# Patient Record
Sex: MALE | Race: WHITE
[De-identification: names, ages, dates, MRNs, and addresses within clinical notes are randomized per-mention and may not be internally consistent; named-entity substitution may affect disease eponyms.]

---

## 2020-04-23 ENCOUNTER — HOSPITAL ENCOUNTER (INPATIENT)
Dept: HOSPITAL 52 - LL.ED | Age: 55
LOS: 4 days | Discharge: HOME | DRG: 418 | End: 2020-04-27
Attending: EMERGENCY MEDICINE | Admitting: EMERGENCY MEDICINE
Payer: COMMERCIAL

## 2020-04-23 DIAGNOSIS — D64.89: ICD-10-CM

## 2020-04-23 DIAGNOSIS — R50.82: ICD-10-CM

## 2020-04-23 DIAGNOSIS — E88.09: ICD-10-CM

## 2020-04-23 DIAGNOSIS — Z79.899: ICD-10-CM

## 2020-04-23 DIAGNOSIS — K56.7: ICD-10-CM

## 2020-04-23 DIAGNOSIS — Z87.891: ICD-10-CM

## 2020-04-23 DIAGNOSIS — K80.00: Primary | ICD-10-CM

## 2020-04-23 DIAGNOSIS — E83.51: ICD-10-CM

## 2020-04-23 DIAGNOSIS — K21.0: ICD-10-CM

## 2020-04-23 DIAGNOSIS — K44.9: ICD-10-CM

## 2020-04-23 PROCEDURE — C9113 INJ PANTOPRAZOLE SODIUM, VIA: HCPCS

## 2020-04-23 PROCEDURE — G0378 HOSPITAL OBSERVATION PER HR: HCPCS

## 2020-04-24 LAB
CHLORIDE SERPL-SCNC: 102 MMOL/L (ref 98–107)
CHLORIDE SERPL-SCNC: 105 MMOL/L (ref 98–107)
SODIUM SERPL-SCNC: 140 MMOL/L (ref 136–145)
SODIUM SERPL-SCNC: 140 MMOL/L (ref 136–145)

## 2020-04-24 PROCEDURE — 0FT44ZZ RESECTION OF GALLBLADDER, PERCUTANEOUS ENDOSCOPIC APPROACH: ICD-10-PCS | Performed by: SURGERY

## 2020-04-24 RX ADMIN — Medication PRN ML: at 17:41

## 2020-04-24 NOTE — PCM.PN
- General Info


Date of Service: 04/24/20


Admission Dx/Problem (Free Text): 





Epigastric/RUQ pain


Subjective Update: 





Pain improved with morphine.  Continues to persist however with no otherwise 

significant changes.  No appetite.


Functional Status: Reports: Pain Controlled (2/10 at this time with MS), 

Ambulating, Urinating.  Denies: New Symptoms





- Review of Systems


General: Reports: Appetite (low).  Denies: Fever, Weakness, Fatigue, Malaise, 

Chills, Night Sweats


HEENT: Reports: No Symptoms


Pulmonary: Reports: No Symptoms


Cardiovascular: Denies: Chest Pain, Palpitations, Dyspnea on Exertion, Orthopnea

, Edema, Lightheadedness


Gastrointestinal: Reports: Abdominal Pain, Decreased Appetite, Nausea, Other (

pain at times radiates to patient's back).  Denies: Constipation, Diarrhea, 

Difficulty Swallowing, Flatus, Melena, Vomiting


Genitourinary: Reports: No Symptoms


Musculoskeletal: Reports: No Symptoms


Skin: Reports: No Symptoms


Neurological: Reports: No Symptoms


Psychiatric: Reports: No Symptoms





- Patient Data


Vitals - Most Recent: 


 Last Vital Signs











Temp  36.6 C   04/24/20 06:19


 


Pulse  95   04/24/20 06:19


 


Resp  19   04/24/20 06:19


 


BP  128/77   04/24/20 06:19


 


Pulse Ox  99   04/24/20 06:19











Weight - Most Recent: 78.471 kg


I&O - Last 24 Hours: 


 Intake & Output











 04/23/20 04/24/20 04/24/20





 22:59 06:59 14:59


 


Output Total   500


 


Balance   -500











Lab Results Last 24 Hours: 


 Laboratory Results - last 24 hr











  04/23/20 04/23/20 04/23/20 Range/Units





  23:22 23:22 23:22 


 


WBC  9.7    (4.0-10.2)  K/uL


 


RBC  4.92    (4.33-5.41)  M/uL


 


Hgb  14.5    (13.1-16.8)  g/dL


 


Hct  43.5    (39.0-49.0)  %


 


MCV  88.4    (84.0-98.0)  fL


 


MCH  29.5    (28.2-33.3)  pg


 


MCHC  33.3    (31.7-36.0)  g/dL


 


RDW  13.2    (11.2-14.1)  %


 


Plt Count  286    (150-350)  K/uL


 


Neut % (Auto)  63.5    (45.0-80.0)  %


 


Lymph % (Auto)  25.4    (10.0-50.0)  %


 


Mono % (Auto)  9.1    (2.0-14.0)  %


 


Eos % (Auto)  1.7    (0.0-5.0)  %


 


Baso % (Auto)  0.3    (0.0-2.0)  %


 


Neut # (Auto)  6.17    (1.40-7.00)  K/uL


 


Lymph # (Auto)  2.47    (0.50-3.50)  K/uL


 


Mono # (Auto)  0.88    (0.00-1.00)  K/uL


 


Eos # (Auto)  0.17    (0.00-0.50)  K/uL


 


Baso # (Auto)  0.03    (0.00-0.20)  K/uL


 


Sodium   140   (136-145)  mmol/L


 


Potassium   3.9   (3.5-5.1)  mmol/L


 


Chloride   102   ()  mmol/L


 


Carbon Dioxide   29.7   (21.0-32.0)  mmol/L


 


BUN   11   (7-18)  mg/dL


 


Creatinine   0.92   (0.51-1.17)  mg/dL


 


Est Cr Clr Drug Dosing   88.80   mL/min


 


Estimated GFR (MDRD)   > 60   mL/min


 


Glucose   103   ()  mg/dL


 


Lactic Acid    1.1  (0.4-2.0)  mmol/L


 


Calcium   8.6   (8.5-10.1)  mg/dL


 


Total Bilirubin   0.2   (0.2-1.0)  mg/dL


 


AST   19   (15-37)  U/L


 


ALT   40   (12-78)  U/L


 


Alkaline Phosphatase   77   ()  IU/L


 


Creatine Kinase     ()  U/L


 


Creatine Kinase Index     (0.0-2.5)  %


 


CK-MB (CK-2)     (0.00-3.60)  ng/mL


 


Troponin I     (0.000-0.056)  ng/mL


 


Total Protein   7.8   (6.4-8.2)  g/dL


 


Albumin   3.9   (3.4-5.0)  g/dL


 


Amylase   51   ()  U/L


 


Lipase   150   ()  U/L


 


Specimen Type     


 


Urine Color     


 


Urine Appearance     


 


Urine pH     (5.0-9.0)  


 


Ur Specific Gravity     (1.005-1.030)  


 


Urine Protein     (NEGATIVE)  mg/dL


 


Urine Glucose (UA)     (NEGATIVE)  mg/dL


 


Urine Ketones     (NEGATIVE)  mg/dL


 


Urine Occult Blood     (NEGATIVE)  


 


Urine Nitrite     (NEGATIVE)  


 


Urine Bilirubin     (NEGATIVE)  


 


Urine Urobilinogen     (0.2-1.0)  E.U./dL


 


Ur Leukocyte Esterase     (NEGATIVE)  


 


Urine RBC     /HPF


 


Urine WBC     /HPF


 


Ur Epithelial Cells     /LPF


 


Amorphous Sediment     (0/HPF)  /HPF


 


Urine Bacteria     (NONE TO FEW)  /HPF














  04/23/20 04/23/20 04/24/20 Range/Units





  23:25 23:33 10:50 


 


WBC    9.7  (4.0-10.2)  K/uL


 


RBC    4.43  (4.33-5.41)  M/uL


 


Hgb    13.1  (13.1-16.8)  g/dL


 


Hct    39.4  (39.0-49.0)  %


 


MCV    88.9  (84.0-98.0)  fL


 


MCH    29.6  (28.2-33.3)  pg


 


MCHC    33.2  (31.7-36.0)  g/dL


 


RDW    13.2  (11.2-14.1)  %


 


Plt Count    259  (150-350)  K/uL


 


Neut % (Auto)    77.4  (45.0-80.0)  %


 


Lymph % (Auto)    14.2  (10.0-50.0)  %


 


Mono % (Auto)    8.0  (2.0-14.0)  %


 


Eos % (Auto)    0.2  (0.0-5.0)  %


 


Baso % (Auto)    0.2  (0.0-2.0)  %


 


Neut # (Auto)    7.49 H  (1.40-7.00)  K/uL


 


Lymph # (Auto)    1.38  (0.50-3.50)  K/uL


 


Mono # (Auto)    0.78  (0.00-1.00)  K/uL


 


Eos # (Auto)    0.02  (0.00-0.50)  K/uL


 


Baso # (Auto)    0.02  (0.00-0.20)  K/uL


 


Sodium     (136-145)  mmol/L


 


Potassium     (3.5-5.1)  mmol/L


 


Chloride     ()  mmol/L


 


Carbon Dioxide     (21.0-32.0)  mmol/L


 


BUN     (7-18)  mg/dL


 


Creatinine     (0.51-1.17)  mg/dL


 


Est Cr Clr Drug Dosing     mL/min


 


Estimated GFR (MDRD)     mL/min


 


Glucose     ()  mg/dL


 


Lactic Acid     (0.4-2.0)  mmol/L


 


Calcium     (8.5-10.1)  mg/dL


 


Total Bilirubin     (0.2-1.0)  mg/dL


 


AST     (15-37)  U/L


 


ALT     (12-78)  U/L


 


Alkaline Phosphatase     ()  IU/L


 


Creatine Kinase   61   ()  U/L


 


Creatine Kinase Index   0.7   (0.0-2.5)  %


 


CK-MB (CK-2)   0.40   (0.00-3.60)  ng/mL


 


Troponin I   0.000   (0.000-0.056)  ng/mL


 


Total Protein     (6.4-8.2)  g/dL


 


Albumin     (3.4-5.0)  g/dL


 


Amylase     ()  U/L


 


Lipase     ()  U/L


 


Specimen Type  Urinvoid    


 


Urine Color  Yellow    


 


Urine Appearance  Cloudy    


 


Urine pH  8.5    (5.0-9.0)  


 


Ur Specific Gravity  1.020    (1.005-1.030)  


 


Urine Protein  Negative    (NEGATIVE)  mg/dL


 


Urine Glucose (UA)  Negative    (NEGATIVE)  mg/dL


 


Urine Ketones  Negative    (NEGATIVE)  mg/dL


 


Urine Occult Blood  Negative    (NEGATIVE)  


 


Urine Nitrite  Negative    (NEGATIVE)  


 


Urine Bilirubin  Negative    (NEGATIVE)  


 


Urine Urobilinogen  0.2    (0.2-1.0)  E.U./dL


 


Ur Leukocyte Esterase  Negative    (NEGATIVE)  


 


Urine RBC  Not seen    /HPF


 


Urine WBC  Not seen    /HPF


 


Ur Epithelial Cells  Not seen    /LPF


 


Amorphous Sediment  Many H    (0/HPF)  /HPF


 


Urine Bacteria  Few    (NONE TO FEW)  /HPF














  04/24/20 04/24/20 Range/Units





  10:50 10:50 


 


WBC    (4.0-10.2)  K/uL


 


RBC    (4.33-5.41)  M/uL


 


Hgb    (13.1-16.8)  g/dL


 


Hct    (39.0-49.0)  %


 


MCV    (84.0-98.0)  fL


 


MCH    (28.2-33.3)  pg


 


MCHC    (31.7-36.0)  g/dL


 


RDW    (11.2-14.1)  %


 


Plt Count    (150-350)  K/uL


 


Neut % (Auto)    (45.0-80.0)  %


 


Lymph % (Auto)    (10.0-50.0)  %


 


Mono % (Auto)    (2.0-14.0)  %


 


Eos % (Auto)    (0.0-5.0)  %


 


Baso % (Auto)    (0.0-2.0)  %


 


Neut # (Auto)    (1.40-7.00)  K/uL


 


Lymph # (Auto)    (0.50-3.50)  K/uL


 


Mono # (Auto)    (0.00-1.00)  K/uL


 


Eos # (Auto)    (0.00-0.50)  K/uL


 


Baso # (Auto)    (0.00-0.20)  K/uL


 


Sodium  140   (136-145)  mmol/L


 


Potassium  4.1   (3.5-5.1)  mmol/L


 


Chloride  105   ()  mmol/L


 


Carbon Dioxide  24.7   (21.0-32.0)  mmol/L


 


BUN  11   (7-18)  mg/dL


 


Creatinine  0.87   (0.51-1.17)  mg/dL


 


Est Cr Clr Drug Dosing  93.91   mL/min


 


Estimated GFR (MDRD)  > 60   mL/min


 


Glucose  114 H   ()  mg/dL


 


Lactic Acid   1.5  (0.4-2.0)  mmol/L


 


Calcium  8.1 L   (8.5-10.1)  mg/dL


 


Total Bilirubin  0.5   (0.2-1.0)  mg/dL


 


AST  15   (15-37)  U/L


 


ALT  33   (12-78)  U/L


 


Alkaline Phosphatase  63   ()  IU/L


 


Creatine Kinase    ()  U/L


 


Creatine Kinase Index    (0.0-2.5)  %


 


CK-MB (CK-2)    (0.00-3.60)  ng/mL


 


Troponin I    (0.000-0.056)  ng/mL


 


Total Protein  6.8   (6.4-8.2)  g/dL


 


Albumin  3.5   (3.4-5.0)  g/dL


 


Amylase  43   ()  U/L


 


Lipase  97   ()  U/L


 


Specimen Type    


 


Urine Color    


 


Urine Appearance    


 


Urine pH    (5.0-9.0)  


 


Ur Specific Gravity    (1.005-1.030)  


 


Urine Protein    (NEGATIVE)  mg/dL


 


Urine Glucose (UA)    (NEGATIVE)  mg/dL


 


Urine Ketones    (NEGATIVE)  mg/dL


 


Urine Occult Blood    (NEGATIVE)  


 


Urine Nitrite    (NEGATIVE)  


 


Urine Bilirubin    (NEGATIVE)  


 


Urine Urobilinogen    (0.2-1.0)  E.U./dL


 


Ur Leukocyte Esterase    (NEGATIVE)  


 


Urine RBC    /HPF


 


Urine WBC    /HPF


 


Ur Epithelial Cells    /LPF


 


Amorphous Sediment    (0/HPF)  /HPF


 


Urine Bacteria    (NONE TO FEW)  /HPF











Med Orders - Current: 


 Current Medications





Calcium Carbonate/Glycine (Tums Extra Strength)  750 mg PO Q2HR PRN


   PRN Reason: Indigestion


Sodium Chloride (Normal Saline)  1,000 mls @ 125 mls/hr IV ASDIRECTED WING


   Last Admin: 04/24/20 10:00 Dose:  125 mls/hr


Morphine Sulfate (Morphine)  2 mg IVPUSH Q2H PRN


   PRN Reason: Pain (severe 7-10)


Ondansetron HCl (Zofran)  4 mg IVPUSH Q6H PRN


   PRN Reason: Nausea/Vomiting


   Last Admin: 04/24/20 06:10 Dose:  4 mg


Sodium Chloride (Saline Flush)  10 ml FLUSH ASDIRECTED PRN


   PRN Reason: Keep Vein Open





Discontinued Medications





Al Hydroxide/Mg Hydroxide (Gi Cocktail)  30 ml PO ONETIME ONE


   Stop: 04/23/20 23:22


   Last Admin: 04/23/20 23:27 Dose:  30 ml


Famotidine (Pepcid)  20 mg IVPUSH ONETIME ONE


   Stop: 04/24/20 00:37


   Last Admin: 04/24/20 01:40 Dose:  20 mg


Hydromorphone HCl (Dilaudid)  1 mg IM ONETIME ONE


   Stop: 04/24/20 00:44


   Last Admin: 04/24/20 01:04 Dose:  1 mg


Iopamidol (Isovue-300 (61%))  100 ml IVPUSH ONETIME ONE


   Stop: 04/24/20 01:21


   Last Admin: 04/24/20 02:03 Dose:  100 ml


Morphine Sulfate (Morphine)  4 mg IVPUSH ONETIME ONE


   Stop: 04/24/20 00:40


   Last Admin: 04/24/20 02:36 Dose:  Not Given


Ondansetron HCl (Zofran)  4 mg IVPUSH ONETIME ONE


   Stop: 04/24/20 00:40


   Last Admin: 04/24/20 02:37 Dose:  Not Given


Ondansetron HCl (Zofran Odt)  4 mg PO ONETIME ONE


   Stop: 04/24/20 00:45


   Last Admin: 04/24/20 01:03 Dose:  4 mg


Pantoprazole Sodium (Protonix Iv***)  80 mg IVPUSH .BOLUS ONE


   Stop: 04/24/20 00:37


   Last Admin: 04/24/20 01:40 Dose:  80 mg


Sucralfate (Carafate)  1 gm PO ONETIME ONE


   Stop: 04/24/20 00:41


   Last Admin: 04/24/20 01:03 Dose:  1 gm











- Exam


General: Alert, Oriented, Cooperative, Mild Distress


HEENT: Pupils Equal, Pupils Reactive, EOMI, Mucous Membr. Moist/Pink


Neck: Supple


Lungs: Clear to Auscultation, Normal Respiratory Effort


Cardiovascular: Regular Rate, Regular Rhythm


GI/Abdominal Exam: No Distention, Guarding, Tender (epigastric and RUQ), 

Abnormal Bowel Sounds (diminished throughout).  No: Rigid, Rebound


 (Male) Exam: Deferred


Extremities: Normal Inspection, Normal Capillary Refill


Skin: Warm, Dry


Neurological: No New Focal Deficit


Psy/Mental Status: Alert, Normal Affect, Normal Mood





Sepsis Event Note





- Evaluation


Sepsis Screening Result: No Definite Risk





- Focused Exam


Vital Signs: 


 Vital Signs











  Temp Pulse Resp BP BP Pulse Ox


 


 04/24/20 06:19  36.6 C  95  19  128/77   99


 


 04/24/20 00:34       99


 


 04/24/20 00:33  37.2 C  78  14   124/72  99











Date Exam was Performed: 04/24/20


Time Exam was Performed: 11:56





- Problem List & Annotations


(1) Epigastric pain


SNOMED Code(s): 23092210


   Code(s): R10.13 - EPIGASTRIC PAIN   Status: Acute   Priority: High   Current 

Visit: Yes   Onset Date: 04/22/20   Annotation/Comment:: CT of abdomen shows 

distended gallbladder with multiple stones.  Pain has not improved overall 

since admission, only temporary improvement with MS. Normal LFTs. Normal WBC. 

Cholecystectomy discussed with patient and  (Surgeon).  Patient 

agreeable with having cholecystectomy.     





(2) Hiatal hernia


SNOMED Code(s): 86207656


   Code(s): K44.9 - DIAPHRAGMATIC HERNIA WITHOUT OBSTRUCTION OR GANGRENE   

Status: Chronic   Priority: High   Current Visit: Yes   Annotation/Comment:: 

History of Hiatal Hernia with planned surgical procedure this summer to help 

alleviate symptoms.   





- Problem List Review


Problem List Initiated/Reviewed/Updated: Yes





- My Orders


Last 24 Hours: 


My Active Orders





04/24/20 00:33


Patient Status [ADT] Routine 


Oxygen Therapy [RC] PRN 


Up ad Nadeen [RC] 08,20 


Vital Signs [RC] Q4HR 


Morphine   2 mg IVPUSH Q2H PRN 


Ondansetron [Zofran]   4 mg IVPUSH Q6H PRN 


Sodium Chloride 0.9% [Saline Flush]   10 ml FLUSH ASDIRECTED PRN 


Saline Lock Insert [OM.PC] Routine 


Resuscitation Status Routine 





04/24/20 00:34


Pulse Oximetry [RC] PRN 





04/24/20 00:36


Abdomen Pelvis w Cont [CT] Routine 





04/24/20 00:40


OCCULT BLOOD DIAGNOSTIC [OP] Routine 


Calcium Carbonate [Tums Extra Strength]   750 mg PO Q2HR PRN 





04/24/20 00:41


H PYLORI STOOL ANTIGEN [MREF] Routine 





04/24/20 00:45


Sodium Chloride 0.9% [Normal Saline] 1,000 ml IV ASDIRECTED 





04/24/20 02:42


Abdomen Comp [US] Routine 














- Assessment


Assessment:: 





as above





- Plan


Plan:: 





Hospital working with patient's insurance to get authorization for 

cholecystectomy.  Once approved, procedure will be able to be performed at our 

facility.  Anticipate discharge of patient home either later this evening or 

tomorrow depending on clinical course/recovery from procedure.

## 2020-04-24 NOTE — EDM.PDOC
ED HPI GENERAL MEDICAL PROBLEM





- General


Chief Complaint: Abdominal Pain


Stated Complaint: Abd Pain


Time Seen by Provider: 04/23/20 23:45


Source of Information: Reports: Patient


History Limitations: Reports: No Limitations





- History of Present Illness


INITIAL COMMENTS - FREE TEXT/NARRATIVE: 





Patient comes in to ER with complaint of 7-8 hours of relatively constant 

epigastric pain.  At times feels like it radiates towards back.  Burping/

antacids briefly help with pain but do not last long.  Decreased appetite but 

did eat some food throughout day.  Last meal around 6-7pm.  Had similar pain 

two days ago that lasted around 12 hours.  History of hiatal hernia.  Supposed 

to be scheduled for surgical procedure this summer to help with the hiatal 

hernia and reflux symptoms.  On Protonix.  Previously diagnosed with Lyles's 

Esophagus but that cleared up on last upper GI scope. 


No specific trigger known for these two episodes.  


No history of GI surgeries. 


Tried to make self vomit two days ago but did not help. 


Denies vomiting/nausea/bowel changes.  No blood in stool.  Burping more 

frequently. 





No recent other changes/illnesses that he notes. 


HEENT negative for headache/URI complaints/dizzy/vision changes


Resp negative for SOB/cough/wheeze/acute changes


CV negative for palpitations/chest wall discomfort/pain in chest area other 

than epigastric area as noted above. 


 negative for burning/frequency/hematuria


MS/Neuro negative for limb pain/weakness/other acute changes. 


  ** Upper Epigastric


Pain Score (Numeric/FACES): 6





- Related Data


 Allergies











Allergy/AdvReac Type Severity Reaction Status Date / Time


 


No Known Allergies Allergy   Verified 09/06/18 02:12











Home Meds: 


 Home Meds





Pantoprazole Sodium [Protonix] 40 mg PO DAILY 09/06/18 [History]


Calcium Carb/Magnesium Hydrox [Rolaids Chewable Tablet] 4 tab PO ASDIRECTED 04/ 23/20 [History]











Past Medical History


HEENT History: Reports: Other (See Below)


Other HEENT History: snoring (working with [employer])


Cardiovascular History: Reports: None, Other (See Below) (borderline elevated 

BPs in past)


Respiratory History: Reports: None


Gastrointestinal History: Reports: GERD, Hiatal Hernia


Other Gastrointestinal History: Barrets


Genitourinary History: Reports: None


Musculoskeletal History: Reports: None


Neurological History: Reports: Other (See Below)


Other Neuro History: RLS


Psychiatric History: Reports: None


Endocrine/Metabolic History: Reports: None


Hematologic History: Reports: None


Immunologic History: Reports: None


Oncologic (Cancer) History: Reports: None


Dermatologic History: Reports: None





- Infectious Disease History


Infectious Disease History: Reports: Chicken Pox





Social & Family History





- Tobacco Use


Smoking Status *Q: Former Smoker


Years of Tobacco use: 10


Packs/Tins Daily: 0.5


Used Tobacco, but Quit: Yes


Month/Year Tobacco Last Used: 01/1990





- Caffeine Use


Caffeine Use: Reports: Coffee





- Alcohol Use


Alcohol Use History: Yes


Alcohol Use Frequency: Socially





- Recreational Drug Use


Recreational Drug Use: No





ED ROS GENERAL





- Review of Systems


Review Of Systems: Comprehensive ROS is negative, except as noted in HPI.





ED EXAM, GENERAL





- Physical Exam


Exam: See Below


Exam Limited By: No Limitations


General Appearance: Alert, WD/WN, Moderate Distress


Eye Exam: Bilateral Eye: EOMI, PERRL


Ears: Hearing Grossly Normal


Nose: No: Nasal Deformity, Nasal Swelling, Nasal Drainage


Throat/Mouth: Normal Lips, Normal Voice, No Airway Compromise


Head: Atraumatic, Normocephalic


Neck: Normal Inspection, Supple, Non-Tender, Full Range of Motion


Respiratory/Chest: No Respiratory Distress, Lungs Clear, Normal Breath Sounds, 

No Accessory Muscle Use, Chest Non-Tender


Cardiovascular: Regular Rate, Rhythm, No Murmur


GI/Abdominal: Soft, Tender (RUQ and epigastric area), Abnormal Bowel Sounds (

decreased throughout).  No: Guarding, Rigid, Rebound


 (Male) Exam: Deferred


Rectal (Males) Exam: Deferred


Back Exam: Normal Inspection.  No: CVA Tenderness (L), CVA Tenderness (R), 

Muscle Spasm, Paraspinal Tenderness, Vertebral Tenderness


Extremities: Normal Inspection, Normal Range of Motion, Non-Tender, Normal 

Capillary Refill


Neurological: Alert, Oriented, Normal Cognition, Normal Gait, Other (equal tone/

strength bilaterally)


Psychiatric: Anxious


Skin Exam: Warm, Dry, Intact, Normal Color





Course





- Vital Signs


Last Recorded V/S: 


 Last Vital Signs











Temp  36.9 C   04/23/20 23:28


 


Pulse  83   04/23/20 23:28


 


Resp  12   04/23/20 23:28


 


BP  145/78 H  04/23/20 23:28


 


Pulse Ox  100   04/23/20 23:28














- Orders/Labs/Meds


Orders: 


 Medication Orders





Calcium Carbonate/Glycine (Tums Extra Strength)  750 mg PO Q2HR PRN


   PRN Reason: Indigestion


Famotidine (Pepcid)  20 mg IVPUSH ONETIME ONE


   Stop: 04/24/20 00:37


Hydromorphone HCl (Dilaudid)  1 mg IM ONETIME ONE


   Stop: 04/24/20 00:44


Sodium Chloride (Normal Saline)  1,000 mls @ 125 mls/hr IV ASDIRECTED WING


Morphine Sulfate (Morphine)  2 mg IVPUSH Q2H PRN


   PRN Reason: Pain (severe 7-10)


Ondansetron HCl (Zofran)  4 mg IVPUSH Q6H PRN


   PRN Reason: Nausea/Vomiting


Ondansetron HCl (Zofran Odt)  4 mg PO ONETIME ONE


   Stop: 04/24/20 00:45


Pantoprazole Sodium (Protonix Iv***)  80 mg IVPUSH .BOLUS ONE


   Stop: 04/24/20 00:37


Sodium Chloride (Saline Flush)  10 ml FLUSH ASDIRECTED PRN


   PRN Reason: Keep Vein Open


Sucralfate (Carafate)  1 gm PO ONETIME ONE


   Stop: 04/24/20 00:41








Labs: 


 Laboratory Tests











  04/23/20 04/23/20 04/23/20 Range/Units





  23:22 23:22 23:22 


 


WBC  9.7    (4.0-10.2)  K/uL


 


RBC  4.92    (4.33-5.41)  M/uL


 


Hgb  14.5    (13.1-16.8)  g/dL


 


Hct  43.5    (39.0-49.0)  %


 


MCV  88.4    (84.0-98.0)  fL


 


MCH  29.5    (28.2-33.3)  pg


 


MCHC  33.3    (31.7-36.0)  g/dL


 


RDW  13.2    (11.2-14.1)  %


 


Plt Count  286    (150-350)  K/uL


 


Neut % (Auto)  63.5    (45.0-80.0)  %


 


Lymph % (Auto)  25.4    (10.0-50.0)  %


 


Mono % (Auto)  9.1    (2.0-14.0)  %


 


Eos % (Auto)  1.7    (0.0-5.0)  %


 


Baso % (Auto)  0.3    (0.0-2.0)  %


 


Neut # (Auto)  6.17    (1.40-7.00)  K/uL


 


Lymph # (Auto)  2.47    (0.50-3.50)  K/uL


 


Mono # (Auto)  0.88    (0.00-1.00)  K/uL


 


Eos # (Auto)  0.17    (0.00-0.50)  K/uL


 


Baso # (Auto)  0.03    (0.00-0.20)  K/uL


 


Sodium   140   (136-145)  mmol/L


 


Potassium   3.9   (3.5-5.1)  mmol/L


 


Chloride   102   ()  mmol/L


 


Carbon Dioxide   29.7   (21.0-32.0)  mmol/L


 


BUN   11   (7-18)  mg/dL


 


Creatinine   0.92   (0.51-1.17)  mg/dL


 


Est Cr Clr Drug Dosing   88.80   mL/min


 


Estimated GFR (MDRD)   > 60   mL/min


 


Glucose   103   ()  mg/dL


 


Lactic Acid    1.1  (0.4-2.0)  mmol/L


 


Calcium   8.6   (8.5-10.1)  mg/dL


 


Total Bilirubin   0.2   (0.2-1.0)  mg/dL


 


AST   19   (15-37)  U/L


 


ALT   40   (12-78)  U/L


 


Alkaline Phosphatase   77   ()  IU/L


 


Creatine Kinase     ()  U/L


 


Creatine Kinase Index     (0.0-2.5)  %


 


CK-MB (CK-2)     (0.00-3.60)  ng/mL


 


Troponin I     (0.000-0.056)  ng/mL


 


Total Protein   7.8   (6.4-8.2)  g/dL


 


Albumin   3.9   (3.4-5.0)  g/dL


 


Amylase   51   ()  U/L


 


Lipase   150   ()  U/L


 


Specimen Type     


 


Urine Color     


 


Urine Appearance     


 


Urine pH     (5.0-9.0)  


 


Ur Specific Gravity     (1.005-1.030)  


 


Urine Protein     (NEGATIVE)  mg/dL


 


Urine Glucose (UA)     (NEGATIVE)  mg/dL


 


Urine Ketones     (NEGATIVE)  mg/dL


 


Urine Occult Blood     (NEGATIVE)  


 


Urine Nitrite     (NEGATIVE)  


 


Urine Bilirubin     (NEGATIVE)  


 


Urine Urobilinogen     (0.2-1.0)  E.U./dL


 


Ur Leukocyte Esterase     (NEGATIVE)  


 


Urine RBC     /HPF


 


Urine WBC     /HPF


 


Ur Epithelial Cells     /LPF


 


Amorphous Sediment     (0/HPF)  /HPF


 


Urine Bacteria     (NONE TO FEW)  /HPF














  04/23/20 04/23/20 Range/Units





  23:25 23:33 


 


WBC    (4.0-10.2)  K/uL


 


RBC    (4.33-5.41)  M/uL


 


Hgb    (13.1-16.8)  g/dL


 


Hct    (39.0-49.0)  %


 


MCV    (84.0-98.0)  fL


 


MCH    (28.2-33.3)  pg


 


MCHC    (31.7-36.0)  g/dL


 


RDW    (11.2-14.1)  %


 


Plt Count    (150-350)  K/uL


 


Neut % (Auto)    (45.0-80.0)  %


 


Lymph % (Auto)    (10.0-50.0)  %


 


Mono % (Auto)    (2.0-14.0)  %


 


Eos % (Auto)    (0.0-5.0)  %


 


Baso % (Auto)    (0.0-2.0)  %


 


Neut # (Auto)    (1.40-7.00)  K/uL


 


Lymph # (Auto)    (0.50-3.50)  K/uL


 


Mono # (Auto)    (0.00-1.00)  K/uL


 


Eos # (Auto)    (0.00-0.50)  K/uL


 


Baso # (Auto)    (0.00-0.20)  K/uL


 


Sodium    (136-145)  mmol/L


 


Potassium    (3.5-5.1)  mmol/L


 


Chloride    ()  mmol/L


 


Carbon Dioxide    (21.0-32.0)  mmol/L


 


BUN    (7-18)  mg/dL


 


Creatinine    (0.51-1.17)  mg/dL


 


Est Cr Clr Drug Dosing    mL/min


 


Estimated GFR (MDRD)    mL/min


 


Glucose    ()  mg/dL


 


Lactic Acid    (0.4-2.0)  mmol/L


 


Calcium    (8.5-10.1)  mg/dL


 


Total Bilirubin    (0.2-1.0)  mg/dL


 


AST    (15-37)  U/L


 


ALT    (12-78)  U/L


 


Alkaline Phosphatase    ()  IU/L


 


Creatine Kinase   61  ()  U/L


 


Creatine Kinase Index   0.7  (0.0-2.5)  %


 


CK-MB (CK-2)   0.40  (0.00-3.60)  ng/mL


 


Troponin I   0.000  (0.000-0.056)  ng/mL


 


Total Protein    (6.4-8.2)  g/dL


 


Albumin    (3.4-5.0)  g/dL


 


Amylase    ()  U/L


 


Lipase    ()  U/L


 


Specimen Type  Urinvoid   


 


Urine Color  Yellow   


 


Urine Appearance  Cloudy   


 


Urine pH  8.5   (5.0-9.0)  


 


Ur Specific Gravity  1.020   (1.005-1.030)  


 


Urine Protein  Negative   (NEGATIVE)  mg/dL


 


Urine Glucose (UA)  Negative   (NEGATIVE)  mg/dL


 


Urine Ketones  Negative   (NEGATIVE)  mg/dL


 


Urine Occult Blood  Negative   (NEGATIVE)  


 


Urine Nitrite  Negative   (NEGATIVE)  


 


Urine Bilirubin  Negative   (NEGATIVE)  


 


Urine Urobilinogen  0.2   (0.2-1.0)  E.U./dL


 


Ur Leukocyte Esterase  Negative   (NEGATIVE)  


 


Urine RBC  Not seen   /HPF


 


Urine WBC  Not seen   /HPF


 


Ur Epithelial Cells  Not seen   /LPF


 


Amorphous Sediment  Many H   (0/HPF)  /HPF


 


Urine Bacteria  Few   (NONE TO FEW)  /HPF











Meds: 


Medications











Generic Name Dose Route Start Last Admin





  Trade Name Freq  PRN Reason Stop Dose Admin


 


Calcium Carbonate/Glycine  750 mg  04/24/20 00:40  





  Tums Extra Strength  PO   





  Q2HR PRN   





  Indigestion   





     





     





     


 


Famotidine  20 mg  04/24/20 00:36  





  Pepcid  IVPUSH  04/24/20 00:37  





  ONETIME ONE   





     





     





     





     


 


Hydromorphone HCl  1 mg  04/24/20 00:43  





  Dilaudid  IM  04/24/20 00:44  





  ONETIME ONE   





     





     





     





     


 


Sodium Chloride  1,000 mls @ 125 mls/hr  04/24/20 00:45  





  Normal Saline  IV   





  ASDIRECTED [facility]   





     





     





     





     


 


Morphine Sulfate  2 mg  04/24/20 00:33  





  Morphine  IVPUSH   





  Q2H PRN   





  Pain (severe 7-10)   





     





     





     


 


Ondansetron HCl  4 mg  04/24/20 00:33  





  Zofran  IVPUSH   





  Q6H PRN   





  Nausea/Vomiting   





     





     





     


 


Ondansetron HCl  4 mg  04/24/20 00:44  





  Zofran Odt  PO  04/24/20 00:45  





  ONETIME ONE   





     





     





     





     


 


Pantoprazole Sodium  80 mg  04/24/20 00:36  





  Protonix Iv***  IVPUSH  04/24/20 00:37  





  .BOLUS ONE   





     





     





     





     


 


Sodium Chloride  10 ml  04/24/20 00:33  





  Saline Flush  FLUSH   





  ASDIRECTED PRN   





  Keep Vein Open   





     





     





     


 


Sucralfate  1 gm  04/24/20 00:40  





  Carafate  PO  04/24/20 00:41  





  ONETIME ONE   





     





     





     





     














Discontinued Medications














Generic Name Dose Route Start Last Admin





  Trade Name Daniela  PRN Reason Stop Dose Admin


 


Al Hydroxide/Mg Hydroxide  30 ml  04/23/20 23:21  04/23/20 23:27





  Gi Cocktail  PO  04/23/20 23:22  30 ml





  ONETIME ONE   Administration





     





     





     





     


 


Morphine Sulfate  4 mg  04/24/20 00:39  





  Morphine  IVPUSH  04/24/20 00:40  





  ONETIME ONE   





     





     





     





     


 


Ondansetron HCl  4 mg  04/24/20 00:39  





  Zofran  IVPUSH  04/24/20 00:40  





  ONETIME ONE   





     





     





     





     














- Re-Assessments/Exams


Free Text/Narrative Re-Assessment/Exam: 





04/24/20 00:56


Some improvement noted with GI cocktail but pain returned. 


CBC/Chem/Troponin/Amylase/Lipase/UA/lactic acid normal. 


Given patient's GI history/presentation/exam suspect GI cause most likely for 

complaint.


Will admit observation and give IV fluids/pain medication/Protonix/Pepcid and 

get abdominal CT to try to pinpoint cause of pain. 











Departure





- Departure


Time of Disposition: 12:30


Disposition: Refer to Observation


Condition: Good


Clinical Impression: 


 Epigastric pain, Hiatal hernia








- Discharge Information


*PRESCRIPTION DRUG MONITORING PROGRAM REVIEWED*: Not Applicable


*COPY OF PRESCRIPTION DRUG MONITORING REPORT IN PATIENT ARIANE: Not Applicable





Sepsis Event Note





- Evaluation


Sepsis Screening Result: No Definite Risk





- Focused Exam


Vital Signs: 


 Vital Signs











  Temp Pulse Resp BP Pulse Ox


 


 04/23/20 23:28  36.9 C  83  12  145/78 H  100











Date Exam was Performed: 04/24/20


Time Exam was Performed: 00:49





- Problem List & Annotations


(1) Epigastric pain


SNOMED Code(s): 94162545


   Code(s): R10.13 - EPIGASTRIC PAIN   Status: Acute   Priority: High   Current 

Visit: Yes   Onset Date: 04/22/20   Annotation/Comment:: Differential includes 

hiatal hernia/ulcer/spasm.  Given that discomfort extends towards RUQ cannot 

exclude gallbladder contribution.  Will admit to observation for pain control 

and further workup/IV fluids. May need to consider referral to [place] to be seen 

by GI if no improvement is obtained or if findings on CT indicate need for 

transfer.    





(2) Hiatal hernia


SNOMED Code(s): 12696172


   Code(s): K44.9 - DIAPHRAGMATIC HERNIA WITHOUT OBSTRUCTION OR GANGRENE   

Status: Chronic   Priority: High   Current Visit: Yes   Annotation/Comment:: 

History of Hiatal Hernia with planned surgical procedure this summer to help 

alleviate symptoms.   





- Problem List Review


Problem List Initiated/Reviewed/Updated: Yes





- Assessment/Plan


Admission H&P: Please use this note as an admission H&P


Assessment:: 





Stable and suitable for general supervision


Plan: 





as above

## 2020-04-24 NOTE — PCM.OPNOTE
- General Post-Op/Procedure Note


Date of Surgery/Procedure: 04/24/20


Operative Procedure(s): Laparoscopic Cholecystectomy


Findings: 





Acutely inflamed gallbladder with multiple stones and Hydrops


Pre Op Diagnosis: Acute Cholecystitis with cholelithiasis


Post-Op Diagnosis: Same


Anesthesia Technique: General ET Tube


Primary Surgeon: Ge Cheng


Pathology: 





Gallbladder with stones


EBL in mLs: 150


Complications: None


Condition: Good


Free Text/Narrative:: 


 Intake & Output











 04/24/20 04/24/20 04/24/20





 06:59 14:59 22:59


 


Output Total  900 


 


Balance  -900

## 2020-04-24 NOTE — OR
Date of Procedure:  04/24/2020

 

PREOPERATIVE DIAGNOSIS:  Acute cholecystitis with cholelithiasis.

 

POSTOPERATIVE DIAGNOSIS:  Acute cholecystitis with cholelithiasis.

 

OPERATION PERFORMED:  Laparoscopic cholecystectomy.

 

INDICATIONS FOR SURGERY:  This 54-year-old male was hospitalized with upper

abdominal pain.  He had several episodes over the past several days and came in

with a severe unrelenting episode.  Workup has identified cholelithiasis with a

dilated gallbladder which is felt to be the source of his pain, and he comes for

cholecystectomy.

 

FINDINGS:  The gallbladder was severely acutely inflamed with dense adherence of

the gallbladder to the gallbladder bed, especially in its lower aspect.  It

contained multiple large and small stones.  The adjacent liver and other organs

as viewed laparoscopically appeared normal.

 

DESCRIPTION OF PROCEDURE:  The patient was taken to the operating room.  He was

given general endotracheal anesthesia and the abdomen was sterilely prepped and

draped.  An infraumbilical stab wound incision was made.  Through this, a Veress

needle was inserted and pneumoperitoneum via this needle to a pressure of 15

mmHg was achieved with carbon dioxide.  The Veress needle was replaced with a 12

mm trocar into which the 0-degree 10 mm laparoscopic camera was inserted.  Under

direct visualization, 5 mm trocars were placed in the subxiphoid midline in 2

areas of the right abdomen.  All trocar sites were infiltrated with Marcaine

prior to incision.  Intraabdominal inspection was carried out and attention was

turned to the gallbladder.  The gallbladder was markedly dilated and tense, so

an aspirating needle was inserted and the hydrops of bile aspirated from the

gallbladder which decompressed it and then allowed it to be manipulated.  It was

secured with grasping forceps and retracted superiorly and anteriorly as much as

possible.  This was difficult because of the intense inflammation and scarring,

especially in the lower aspect of the gallbladder.  Careful dissection was

carried out, gradually clearing the peritoneum and thick inflamed tissue, and

with persistent careful manipulation, the cystic artery was able to be

identified and isolated and it was then clipped and divided.  The additional

dissection identified what was felt to be the cystic duct.  Dissection was

somewhat difficult around the lower portion of the gallbladder, and eventually

the lower portion of the gallbladder was entered exposing the large stone.  This

did help to identify the anatomy, and with careful additional persistent

dissection, the cystic duct identity was able to be confirmed including its

junction with the gallbladder.  It was then doubly clipped and divided near the

gallbladder.  Additional dissection identified another branch of the cystic

artery which was doubly clipped and divided, and the gallbladder was then slowly

and carefully dissected away from the undersurface of the gallbladder using the

hook cautery device.  During this dissection, a large stone escaped from the

lumen of the gallbladder.  This stone was removed during the course of the

operation.  It had to be fragmented in order to accomplish this.  The formed

stone fragments were also removed leaving minimal stone fragments intra-

abdominally.  Dissection of the gallbladder was continued until it was

completely freed from the liver bed.  It was then placed into an Endo retrieval

bag and it was extracted through the umbilical trocar site.  Because of the

thick gallbladder and multiple large and small stones, the skin and fascial

opening had to be extended just slightly and the gallbladder and stones broken

up, but eventually it was able to be removed without any stone spillage.  Re-

inspection of the gallbladder bed was carried out and copious irrigation of the

operative region was performed.  Careful examination showed no sign of

complication.  A piece of Surgicel was placed in the lower aspect of the

gallbladder bed to assure good hemostasis, and the trocars were removed under

direct visualization and the pneumoperitoneum was evacuated.  The fascia of the

umbilical trocar site was closed with interrupted 0 Vicryl sutures.  The wounds

were carefully irrigated with Betadine and saline solution.  Skin incision was

approximated with interrupted 4-0 Vicryl in subcuticular stitch.  Benzoin and

Steri-Strips were applied followed by antibiotic ointment and sterile dressings.

The patient was then awakened, extubated, and taken from the operating room in

satisfactory condition.

 

ESTIMATED BLOOD LOSS:  150 mL.

 

COMPLICATIONS:  None.

 

PROGNOSIS:  Good.

 

PRISCILLA Cheng MD

DD:  04/24/2020 16:52:53

DT:  04/24/2020 22:58:31

Job #:  [id number]

## 2020-04-25 LAB
CHLORIDE SERPL-SCNC: 107 MMOL/L (ref 98–107)
SODIUM SERPL-SCNC: 140 MMOL/L (ref 136–145)

## 2020-04-25 RX ADMIN — Medication PRN ML: at 10:52

## 2020-04-25 RX ADMIN — Medication PRN ML: at 10:46

## 2020-04-25 RX ADMIN — Medication PRN ML: at 11:30

## 2020-04-25 RX ADMIN — METRONIDAZOLE SCH MLS/HR: 500 SOLUTION INTRAVENOUS at 11:30

## 2020-04-25 RX ADMIN — METRONIDAZOLE SCH MLS/HR: 500 SOLUTION INTRAVENOUS at 19:47

## 2020-04-25 NOTE — PCM.PN
- General Info


Date of Service: 04/25/20


Admission Dx/Problem (Free Text): 





Cholecystitis


Functional Status: Reports: Pain Controlled, Tolerating Diet, Ambulating, 

Urinating, Incentive Spirometry.  Denies: New Symptoms


Pain Score: 2





- Review of Systems


General: Reports: Fever.  Denies: Weakness, Fatigue, Malaise, Chills, Night 

Sweats, Appetite (Improving)


HEENT: Denies: Dysphasia, Ear Pain, Eye Pain, Headaches, Post Nasal Drip, Sinus 

Congestion, Sore Throat, Rhinitis, Visual Changes


Pulmonary: Reports: Pleuritic Chest Pain.  Denies: Shortness of Breath, Cough, 

Sputum, Hemoptysis, Wheezing


Cardiovascular: Reports: Lightheadedness.  Denies: Chest Pain, Palpitations, 

Dyspnea on Exertion, Orthopnea, Edema


Gastrointestinal: Reports: Abdominal Pain, Constipation, Other (No bowel 

movement since surgery).  Denies: Decreased Appetite, Diarrhea, Difficulty 

Swallowing, Flatus, Hematochezia, Melena, Nausea, Vomiting


Genitourinary: Reports: No Symptoms.  Denies: Dysuria, Frequency, Burning, Pain

, Urgency, Incontinence, Hematuria, Retention, Flank Pain


Musculoskeletal: Reports: No Symptoms.  Denies: Neck Pain, Shoulder Pain, Arm 

Pain, Back Pain, Leg Pain


Skin: Reports: Bruising (Normal postop).  Denies: Jaundice, Pallor, Diaphoresis

, Rash


Neurological: Reports: Dizziness.  Denies: Confusion, Headache, Numbness, 

Paresthesia, Seizure, Tingling, Weakness


Psychiatric: Reports: No Symptoms.  Denies: Confusion, Depression, Anxiety, 

Agitation, Cravings, Hallucinations





- Patient Data


Vitals - Most Recent: 


 Last Vital Signs











Temp  36.8 C   04/25/20 08:00


 


Pulse  79   04/25/20 08:00


 


Resp  20   04/25/20 08:00


 


BP  99/56 L  04/25/20 08:00


 


Pulse Ox  96   04/25/20 08:00








 Vital Signs - 24 hr











  04/24/20 04/24/20 04/24/20





  13:20 16:45 16:50


 


Temperature [   





Oral]   


 


Temperature [ 36.7 C 37.3 C 





Temporal]   


 


Pulse, 78 94 





Peripheral [   





Left Pulse   





Oximetry]   


 


Pulse,   





Peripheral [   





Right Pulse   





Oximetry]   


 


Respiratory 19 18 





Rate   


 


Blood Pressure 128/77  





[Left Upper Arm   





]   


 


Blood Pressure 124/72 86/48 L 





[Right Upper   





Arm]   


 


O2 Sat by Pulse 99 94 L 





Oximetry   


 


O2 Sat by Pulse   92 L





Oximetry [Room   





Air]   














  04/24/20 04/24/20 04/24/20





  17:04 17:06 17:07


 


Temperature [   





Oral]   


 


Temperature [ 37.4 C 37.4 C 





Temporal]   


 


Pulse, 79 75 





Peripheral [   





Left Pulse   





Oximetry]   


 


Pulse,   





Peripheral [   





Right Pulse   





Oximetry]   


 


Respiratory 18 20 





Rate   


 


Blood Pressure   





[Left Upper Arm   





]   


 


Blood Pressure 100/55 L  104/69





[Right Upper   





Arm]   


 


O2 Sat by Pulse 91 L 91 L 





Oximetry   


 


O2 Sat by Pulse   





Oximetry [Room   





Air]   














  04/24/20 04/24/20 04/24/20





  17:20 17:46 18:12


 


Temperature [   





Oral]   


 


Temperature [  37.0 C 37.4 C





Temporal]   


 


Pulse,  81 81





Peripheral [   





Left Pulse   





Oximetry]   


 


Pulse,   





Peripheral [   





Right Pulse   





Oximetry]   


 


Respiratory  20 18





Rate   


 


Blood Pressure  102/51 L 





[Left Upper Arm   





]   


 


Blood Pressure   96/54 L





[Right Upper   





Arm]   


 


O2 Sat by Pulse  100 97





Oximetry   


 


O2 Sat by Pulse 95  





Oximetry [Room   





Air]   














  04/24/20 04/24/20 04/24/20





  18:45 19:49 23:56


 


Temperature [   37.3 C





Oral]   


 


Temperature [ 37.3 C 36.8 C 





Temporal]   


 


Pulse,   84





Peripheral [   





Left Pulse   





Oximetry]   


 


Pulse, 78 74 





Peripheral [   





Right Pulse   





Oximetry]   


 


Respiratory 14 18 16





Rate   


 


Blood Pressure 101/55 L 91/55 L 





[Left Upper Arm   





]   


 


Blood Pressure   91/43 L





[Right Upper   





Arm]   


 


O2 Sat by Pulse 95 97 95





Oximetry   


 


O2 Sat by Pulse   





Oximetry [Room   





Air]   














  04/25/20 04/25/20





  03:27 08:00


 


Temperature [ 37.6 C 36.8 C





Oral]  


 


Temperature [  





Temporal]  


 


Pulse, 81 





Peripheral [  





Left Pulse  





Oximetry]  


 


Pulse,  79





Peripheral [  





Right Pulse  





Oximetry]  


 


Respiratory 16 20





Rate  


 


Blood Pressure 103/49 L 99/56 L





[Left Upper Arm  





]  


 


Blood Pressure  





[Right Upper  





Arm]  


 


O2 Sat by Pulse 95 96





Oximetry  


 


O2 Sat by Pulse  





Oximetry [Room  





Air]  











Weight - Most Recent: 78.471 kg


I&O - Last 24 Hours: 


 Intake & Output











 04/24/20 04/25/20 04/25/20





 22:59 06:59 14:59


 


Intake Total 1750 1834 360


 


Output Total 250 350 


 


Balance 1500 1484 360











Imaging Impressions - Last 24 Hours: 





Intraoperative cardiac monitor showed normal sinus rhythm in the 80s to 100s 

with no ectopy or arrhythmia.


Lab Results Last 24 Hours: 


 Laboratory Results - last 24 hr











  04/24/20 04/24/20 04/24/20 Range/Units





  10:50 10:50 10:50 


 


WBC  9.7    (4.0-10.2)  K/uL


 


RBC  4.43    (4.33-5.41)  M/uL


 


Hgb  13.1    (13.1-16.8)  g/dL


 


Hct  39.4    (39.0-49.0)  %


 


MCV  88.9    (84.0-98.0)  fL


 


MCH  29.6    (28.2-33.3)  pg


 


MCHC  33.2    (31.7-36.0)  g/dL


 


RDW  13.2    (11.2-14.1)  %


 


Plt Count  259    (150-350)  K/uL


 


Neut % (Auto)  77.4    (45.0-80.0)  %


 


Lymph % (Auto)  14.2    (10.0-50.0)  %


 


Mono % (Auto)  8.0    (2.0-14.0)  %


 


Eos % (Auto)  0.2    (0.0-5.0)  %


 


Baso % (Auto)  0.2    (0.0-2.0)  %


 


Neut # (Auto)  7.49 H    (1.40-7.00)  K/uL


 


Lymph # (Auto)  1.38    (0.50-3.50)  K/uL


 


Mono # (Auto)  0.78    (0.00-1.00)  K/uL


 


Eos # (Auto)  0.02    (0.00-0.50)  K/uL


 


Baso # (Auto)  0.02    (0.00-0.20)  K/uL


 


Sodium   140   (136-145)  mmol/L


 


Potassium   4.1   (3.5-5.1)  mmol/L


 


Chloride   105   ()  mmol/L


 


Carbon Dioxide   24.7   (21.0-32.0)  mmol/L


 


BUN   11   (7-18)  mg/dL


 


Creatinine   0.87   (0.51-1.17)  mg/dL


 


Est Cr Clr Drug Dosing   93.91   mL/min


 


Estimated GFR (MDRD)   > 60   mL/min


 


Glucose   114 H   ()  mg/dL


 


Lactic Acid    1.5  (0.4-2.0)  mmol/L


 


Calcium   8.1 L   (8.5-10.1)  mg/dL


 


Total Bilirubin   0.5   (0.2-1.0)  mg/dL


 


AST   15   (15-37)  U/L


 


ALT   33   (12-78)  U/L


 


Alkaline Phosphatase   63   ()  IU/L


 


Total Protein   6.8   (6.4-8.2)  g/dL


 


Albumin   3.5   (3.4-5.0)  g/dL


 


Amylase   43   ()  U/L


 


Lipase   97   ()  U/L














  04/25/20 04/25/20 Range/Units





  07:35 07:35 


 


WBC  10.4 H   (4.0-10.2)  K/uL


 


RBC  4.04 L   (4.33-5.41)  M/uL


 


Hgb  12.0 L   (13.1-16.8)  g/dL


 


Hct  36.6 L   (39.0-49.0)  %


 


MCV  90.6   (84.0-98.0)  fL


 


MCH  29.7   (28.2-33.3)  pg


 


MCHC  32.8   (31.7-36.0)  g/dL


 


RDW  13.6   (11.2-14.1)  %


 


Plt Count  238   (150-350)  K/uL


 


Neut % (Auto)  74.7   (45.0-80.0)  %


 


Lymph % (Auto)  15.4   (10.0-50.0)  %


 


Mono % (Auto)  9.8   (2.0-14.0)  %


 


Eos % (Auto)  0.0   (0.0-5.0)  %


 


Baso % (Auto)  0.1   (0.0-2.0)  %


 


Neut # (Auto)  7.79 H   (1.40-7.00)  K/uL


 


Lymph # (Auto)  1.60   (0.50-3.50)  K/uL


 


Mono # (Auto)  1.02 H   (0.00-1.00)  K/uL


 


Eos # (Auto)  0.00   (0.00-0.50)  K/uL


 


Baso # (Auto)  0.01   (0.00-0.20)  K/uL


 


Sodium   140  (136-145)  mmol/L


 


Potassium   3.8  (3.5-5.1)  mmol/L


 


Chloride   107  ()  mmol/L


 


Carbon Dioxide   25.7  (21.0-32.0)  mmol/L


 


BUN   12  (7-18)  mg/dL


 


Creatinine   1.07  (0.51-1.17)  mg/dL


 


Est Cr Clr Drug Dosing   76.36  mL/min


 


Estimated GFR (MDRD)   > 60  mL/min


 


Glucose   104  ()  mg/dL


 


Lactic Acid    (0.4-2.0)  mmol/L


 


Calcium   7.3 L  (8.5-10.1)  mg/dL


 


Total Bilirubin   0.8  (0.2-1.0)  mg/dL


 


AST   30  (15-37)  U/L


 


ALT   44  (12-78)  U/L


 


Alkaline Phosphatase   47  ()  IU/L


 


Total Protein   6.0 L  (6.4-8.2)  g/dL


 


Albumin   3.0 L  (3.4-5.0)  g/dL


 


Amylase    ()  U/L


 


Lipase    ()  U/L











Huy Results Last 24 Hours: 





None


Med Orders - Current: 


 Current Medications





Acetaminophen (Tylenol)  650 mg PO Q4H PRN


   PRN Reason: Pain (Mild 1-3)/fever


Calcium Carbonate/Glycine (Tums Extra Strength)  750 mg PO Q2HR PRN


   PRN Reason: Indigestion


Sodium Chloride (Normal Saline)  1,000 mls @ 125 mls/hr IV ASDIRECTED [facility]


   Last Admin: 04/24/20 23:55 Dose:  125 mls/hr


Ceftriaxone Sodium 1 gm/ (Sodium Chloride)  100 mls @ 200 mls/hr IV Q12H [facility]


   Last Admin: 04/25/20 10:46 Dose:  200 mls/hr


Metronidazole 500 mg/ Premix  100 mls @ 100 mls/hr IV Q8H [facility]


Morphine Sulfate (Morphine)  2 mg IVPUSH Q2H PRN


   PRN Reason: Pain (severe 7-10)


Ondansetron HCl (Zofran)  4 mg IVPUSH Q6H PRN


   PRN Reason: Nausea/Vomiting


   Last Admin: 04/24/20 06:10 Dose:  4 mg


Sodium Chloride (Saline Flush)  10 ml FLUSH ASDIRECTED PRN


   PRN Reason: Keep Vein Open


   Last Admin: 04/25/20 10:52 Dose:  10 ml


Tramadol HCl (Ultram)  50 mg PO Q6H PRN


   PRN Reason: Pain (moderate 4-6)





Discontinued Medications





Al Hydroxide/Mg Hydroxide (Gi Cocktail)  30 ml PO ONETIME ONE


   Stop: 04/23/20 23:22


   Last Admin: 04/23/20 23:27 Dose:  30 ml


Bupivacaine HCl/Epinephrine Bitart (Marcaine 0.25%/Epinephrine 1:200,000)  30 

ml INJECT .STK-MED ONE


   Stop: 04/24/20 13:51


   Last Admin: 04/24/20 13:50 Dose:  30 ml


Cefazolin Sodium (Ancef)  2 gm IV .STK-MED ONE


   Stop: 04/24/20 13:41


   Last Admin: 04/24/20 13:40 Dose:  2 gm


Famotidine (Pepcid)  20 mg IVPUSH ONETIME ONE


   Stop: 04/24/20 00:37


   Last Admin: 04/24/20 01:40 Dose:  20 mg


Fentanyl (Sublimaze) Confirm Administered Dose 250 mcg .ROUTE .STK-MED ONE


   Stop: 04/24/20 12:50


   Last Admin: 04/24/20 18:56 Dose:  Not Given


Fentanyl (Sublimaze) Confirm Administered Dose 100 mcg .ROUTE .STK-MED ONE


   Stop: 04/24/20 16:16


   Last Admin: 04/24/20 18:56 Dose:  Not Given


Hydromorphone HCl (Dilaudid)  1 mg IM ONETIME ONE


   Stop: 04/24/20 00:44


   Last Admin: 04/24/20 01:04 Dose:  1 mg


Lactated Ringer's (Ringers, Lactated)  1,000 mls @ 75 mls/hr IV ASDIRECTED [facility]


   Last Admin: 04/24/20 12:35 Dose:  75 mls/hr


Piperacillin Sod/Tazobactam (Sod 3.375 gm/ Sodium Chloride)  100 mls @ 200 mls/

hr IV Q6H [facility]


   Stop: 04/25/20 05:29


   Last Admin: 04/25/20 05:04 Dose:  200 mls/hr


Iopamidol (Isovue-300 (61%))  100 ml IVPUSH ONETIME ONE


   Stop: 04/24/20 01:21


   Last Admin: 04/24/20 02:03 Dose:  100 ml


Ketorolac Tromethamine (Toradol)  30 mg IVPUSH ONETIME ONE


   Stop: 04/24/20 17:28


   Last Admin: 04/24/20 17:35 Dose:  30 mg


Meperidine HCl (Demerol)  25 mg IVPUSH ONETIME ONE


   Stop: 04/24/20 12:10


   Last Admin: 04/24/20 12:35 Dose:  25 mg


Midazolam HCl (Versed 1 Mg/Ml) Confirm Administered Dose 2 mg .ROUTE .STK-MED 

ONE


   Stop: 04/24/20 12:50


   Last Admin: 04/24/20 18:56 Dose:  Not Given


Morphine Sulfate (Morphine)  4 mg IVPUSH ONETIME ONE


   Stop: 04/24/20 00:40


   Last Admin: 04/24/20 02:36 Dose:  Not Given


Ondansetron HCl (Zofran)  4 mg IVPUSH ONETIME ONE


   Stop: 04/24/20 00:40


   Last Admin: 04/24/20 02:37 Dose:  Not Given


Ondansetron HCl (Zofran Odt)  4 mg PO ONETIME ONE


   Stop: 04/24/20 00:45


   Last Admin: 04/24/20 01:03 Dose:  4 mg


Pantoprazole Sodium (Protonix Iv***)  80 mg IVPUSH .BOLUS ONE


   Stop: 04/24/20 00:37


   Last Admin: 04/24/20 01:40 Dose:  80 mg


Propofol (Diprivan  20 Ml) Confirm Administered Dose 200 mg .ROUTE .STK-MED ONE


   Stop: 04/24/20 12:50


   Last Admin: 04/24/20 18:56 Dose:  Not Given


Scopolamine (Transderm-Scop)  1.5 mg TRDERM Q72H ONE


   Stop: 04/24/20 12:44


   Last Admin: 04/24/20 12:55 Dose:  1.5 mg


Scopolamine (Transderm-Scop) Confirm Administered Dose 1.5 mg .ROUTE .STK-MED 

ONE


   Stop: 04/24/20 12:44


   Last Admin: 04/24/20 13:48 Dose:  Not Given


Sucralfate (Carafate)  1 gm PO ONETIME ONE


   Stop: 04/24/20 00:41


   Last Admin: 04/24/20 01:03 Dose:  1 gm











- Exam


Quality Assessment: DVT Prophylaxis.  No: Supplemental Oxygen, Central Line/PICC

, Urine Catheter, Skin Breakdown, Restraints


General: Alert, Oriented, Cooperative, No Acute Distress


HEENT: Pupils Equal, Pupils Reactive, EOMI, Mucous Membr. Moist/Pink.  No: 

Scleral Icterus


Neck: Supple, Trachea Midline, No JVD, No Thyromegaly, +2 Carotid Pulse wo 

Bruit.  No: Lymphadenopathy


Lungs: Normal Respiratory Effort, Rales (Mild bilateral basilar).  No: Rub


Cardiovascular: Regular Rate, Regular Rhythm, No Murmurs.  No: Gallops, Rubs


GI/Abdominal Exam: Distended (Mild to moderate), Tender (Mild diffuse palpation 

pain), Abnormal Bowel Sounds (Somewhat increased, however not high-pitched), 

Other (Abdominal incisions are intact with only mild serosanguineous drainage).

  No: Guarding, Rigid, Rebound


 (Male) Exam: Deferred


Back Exam: Normal Inspection, Full Range of Motion.  No: CVA Tenderness (L), 

CVA Tenderness (R), Muscle Spasm


Extremities: Normal Inspection, Normal Range of Motion, Non-Tender, No Pedal 

Edema, Normal Capillary Refill.  No: Juan Pablo's Sign


Peripheral Pulses: 2+: Radial (L), Radial (R), Dorsalis Pedis (L), Dorsalis 

Pedis (R)


Skin: Ecchymosis (Normal postoperative as above)


Wound/Incisions: Drainage (As above).  No: Erythema


Neurological: No New Focal Deficit


Psy/Mental Status: Alert, Normal Affect, Normal Mood.  No: Agitated, 

Hallucinations, Withdrawal Symptoms





Sepsis Event Note





- Evaluation


Sepsis Screening Result: No Definite Risk





- Focused Exam


Vital Signs: 


 Vital Signs











  Temp Pulse Pulse Resp BP BP Pulse Ox


 


 04/25/20 08:00  36.8 C   79  20  99/56 L   96


 


 04/25/20 03:27  37.6 C  81   16  103/49 L   95


 


 04/24/20 23:56  37.3 C  84   16   91/43 L  95











Date Exam was Performed: 04/25/20


Time Exam was Performed: 10:58





- Problem List & Annotations


(1) Acute calculous cholecystitis


SNOMED Code(s): 83874849975308


   Code(s): K80.00 - CALCULUS OF GALLBLADDER W ACUTE CHOLECYST W/O OBSTRUCTION 

  Status: Acute   Priority: High   Current Visit: Yes   Onset Date: 04/24/20   

Annotation/Comment:: Successful laparoscopic cholecystectomy with some 

difficulty on 4/24/20, however no complications including significant blood loss

, etc. Note, however, progressive anemia during this hospitalization. In 

addition, postoperative fever today and persistent constipation with patient 

not having a bowel movement since surgery. He has been gaining to eat slowly 

this morning and is tolerating that well. Patient did receive IV Ancef 

postoperatively, however will initiate additional IV Rocephin and IV Flagyl 

today. He was agreement with extended hospitalization. Abdominal checks with 

vitals.   





(2) Anemia


SNOMED Code(s): 073017839


   Code(s): D64.9 - ANEMIA, UNSPECIFIED   Status: Acute   Current Visit: Yes   

Onset Date: 04/25/20   


Qualifiers: 


   Anemia type: other cause   Other causes of anemia: other cause, not 

classified   Qualified Code(s): D64.89 - Other specified anemias   


Annotation/Comment:: Postoperative anemia with 2.5 g drop in his hemoglobins 

since admission. No significant blood loss during his laparoscopic 

cholecystectomy as above. Observe for now. Further workup depending on his 

clinical course.   





(3) GERD with esophagitis


SNOMED Code(s): 953062826


   Code(s): K21.0 - GASTRO-ESOPHAGEAL REFLUX DISEASE WITH ESOPHAGITIS   Status: 

Chronic   Priority: Medium   Current Visit: Yes   Annotation/Comment:: Known 

history of Lyles's esophagitis. Continue monitoring through his regular 

provider and GI   





(4) Hiatal hernia


SNOMED Code(s): 64345936


   Code(s): K44.9 - DIAPHRAGMATIC HERNIA WITHOUT OBSTRUCTION OR GANGRENE   

Status: Chronic   Priority: High   Current Visit: Yes   Annotation/Comment:: 

Nissen fundoplication could not be conducted concurrently with laparoscopic 

cholecystectomy as above. He does plan to have this procedure later this summer

, however, by his history.   





(5) Hypoalbuminemia


SNOMED Code(s): 345999943


   Code(s): E88.09 - OTH DISORDERS OF PLASMA-PROTEIN METABOLISM, NEC   Status: 

Acute   Priority: Medium   Current Visit: Yes   Onset Date: 04/25/20   

Annotation/Comment:: Observe for now.   





(6) Hypocalcemia


SNOMED Code(s): 1231902


   Code(s): E83.51 - HYPOCALCEMIA   Status: Acute   Priority: Medium   Current 

Visit: Yes   Onset Date: 04/25/20   Annotation/Comment:: Continue Tums for now.

   





- Problem List Review


Problem List Initiated/Reviewed/Updated: Yes





- My Orders


Last 24 Hours: 


My Active Orders





04/25/20 09:42


Communication Order [RC] ROUTINE 





04/25/20 09:44


RT Incentive Spirometry [RC] ASDIRECTED 





04/25/20 09:46


Acetaminophen [Tylenol]   650 mg PO Q4H PRN 


traMADol [Ultram]   50 mg PO Q6H PRN 





04/25/20 10:00


cefTRIAXone [Rocephin] 1 gm   Sodium Chloride 0.9% [Normal Saline] 100 ml IV 

Q12H 





04/25/20 11:00


metroNIDAZOLE/Normal Saline [Flagyl 500 MG in  ML] 500 mg   Premix Bag 1 

bag IV Q8H 





04/26/20 05:11


Abdomen Series w Chest 1V [CR] Routine 


AMYLASE [CHEM] Routine 


CBC WITH AUTO DIFF [HEME] Routine 


COMPREHENSIVE METABOLIC PN,CMP [CHEM] Routine 


INR,PT,PROTHROMBIN TIME [COAG] Routine 


LIPASE [CHEM] Routine 


MAGNESIUM [CHEM] Routine 


PTT,PARTIAL THROMBOPLSTIN TIME [COAG] Routine 














- Assessment


Assessment:: 





As above





- Plan


Plan:: 





As above. Extensive precautions were given to the patient, who is in agreement 

with the treatment plan. Probable patient discharge in the a.m. Continue 

observation status for now.

## 2020-04-26 LAB
APTT PPP: 29.9 SEC (ref 24.5–32.8)
CHLORIDE SERPL-SCNC: 106 MMOL/L (ref 98–107)
SODIUM SERPL-SCNC: 139 MMOL/L (ref 136–145)

## 2020-04-26 RX ADMIN — METRONIDAZOLE SCH MLS/HR: 500 SOLUTION INTRAVENOUS at 10:17

## 2020-04-26 RX ADMIN — Medication PRN ML: at 22:14

## 2020-04-26 RX ADMIN — METRONIDAZOLE SCH MLS/HR: 500 SOLUTION INTRAVENOUS at 19:07

## 2020-04-26 RX ADMIN — Medication PRN ML: at 08:04

## 2020-04-26 RX ADMIN — Medication PRN ML: at 10:17

## 2020-04-26 RX ADMIN — Medication PRN ML: at 09:45

## 2020-04-26 RX ADMIN — METRONIDAZOLE SCH MLS/HR: 500 SOLUTION INTRAVENOUS at 02:14

## 2020-04-26 RX ADMIN — Medication PRN ML: at 17:06

## 2020-04-26 RX ADMIN — Medication PRN ML: at 19:17

## 2020-04-26 NOTE — PCM.PN
- General Info


Date of Service: 04/26/20


Admission Dx/Problem (Free Text): 





Cholecystitis


Functional Status: Reports: Pain Controlled, Tolerating Diet, Ambulating, 

Urinating, Incentive Spirometry.  Denies: New Symptoms


Pain Score: 2





- Review of Systems


General: Reports: Fever.  Denies: Weakness, Fatigue, Malaise, Chills, Night 

Sweats, Appetite (Improving and tolerating diet)


HEENT: Reports: Glasses.  Denies: Dysphasia, Ear Pain, Eye Pain, Headaches, 

Sinus Congestion, Sore Throat, Rhinitis, Visual Changes, Other


Pulmonary: Reports: Shortness of Breath.  Denies: Pleuritic Chest Pain, Cough, 

Sputum, Hemoptysis, Wheezing


Cardiovascular: Reports: Dyspnea on Exertion.  Denies: Chest Pain, Palpitations

, Orthopnea, PND, Edema, Lightheadedness


Gastrointestinal: Reports: Abdominal Pain, Constipation, Flatus.  Denies: 

Decreased Appetite, Diarrhea, Difficulty Swallowing, Hematochezia, Melena, 

Nausea, Vomiting


Genitourinary: Reports: No Symptoms.  Denies: Dysuria, Frequency, Burning, Pain

, Urgency, Incontinence, Hematuria, Retention, Flank Pain


Musculoskeletal: Reports: No Symptoms.  Denies: Neck Pain, Shoulder Pain, Arm 

Pain, Back Pain, Leg Pain


Skin: Reports: Bruising (Improving post operative).  Denies: Cyanosis, Jaundice

, Diaphoresis


Neurological: Reports: No Symptoms.  Denies: Confusion, Dizziness, Headache, 

Numbness, Paresthesia, Syncope, Tingling, Difficulty Walking, Weakness


Psychiatric: Reports: No Symptoms.  Denies: Confusion, Depression, Anxiety, 

Agitation, Cravings, Hallucinations





- Patient Data


Vitals - Most Recent: 


 Last Vital Signs











Temp  37.4 C   04/26/20 04:00


 


Pulse  65   04/26/20 04:00


 


Resp  16   04/26/20 04:00


 


BP  101/54 L  04/26/20 04:00


 


Pulse Ox  94 L  04/26/20 04:00








 Vital Signs - 24 hr











  04/25/20 04/25/20 04/25/20





  12:45 16:00 19:56


 


Temperature [ 37.4 C 37.2 C 36.9 C





Oral]   


 


Pulse,  99 70





Peripheral [   





Left Pulse   





Oximetry]   


 


Pulse, 78  





Peripheral [   





Right Pulse   





Oximetry]   


 


Respiratory 16 16 16





Rate   


 


Blood Pressure 98/58 L  95/61





[Left Upper Arm   





]   


 


Blood Pressure  97/54 L 





[Right Upper   





Arm]   


 


O2 Sat by Pulse 95 98 94 L





Oximetry   














  04/26/20 04/26/20





  02:08 04:00


 


Temperature [ 37.5 C 37.4 C





Oral]  


 


Pulse, 75 





Peripheral [  





Left Pulse  





Oximetry]  


 


Pulse,  65





Peripheral [  





Right Pulse  





Oximetry]  


 


Respiratory 16 16





Rate  


 


Blood Pressure 99/52 L 101/54 L





[Left Upper Arm  





]  


 


Blood Pressure  





[Right Upper  





Arm]  


 


O2 Sat by Pulse 93 L 94 L





Oximetry  











Weight - Most Recent: 78.471 kg


I&O - Last 24 Hours: 


 Intake & Output











 04/25/20 04/26/20 04/26/20





 22:59 06:59 14:59


 


Intake Total 640 1414 


 


Output Total 850 600 


 


Balance -210 814 











Imaging Impressions - Last 24 Hours: 





Acute abdominal x-ray shows mild to moderate cardiomegaly with evidence of some 

fluid overload, including some Kerley B lines. Mild aortic valve calcification 

with no pulmonary infiltrates, pneumothorax, etc. Moderately elevated right 

hemidiaphragm. Occasional diffuse fluid levels consistent with possible 

beginning ileus with additional mild free air secondary to recent laparoscopic 

cholecystectomy.


Lab Results Last 24 Hours: 


 Laboratory Results - last 24 hr











  04/26/20 04/26/20 04/26/20 Range/Units





  07:30 07:30 07:30 


 


WBC  9.9    (4.0-10.2)  K/uL


 


RBC  3.66 L    (4.33-5.41)  M/uL


 


Hgb  10.9 L    (13.1-16.8)  g/dL


 


Hct  33.5 L    (39.0-49.0)  %


 


MCV  91.5    (84.0-98.0)  fL


 


MCH  29.8    (28.2-33.3)  pg


 


MCHC  32.5    (31.7-36.0)  g/dL


 


RDW  13.6    (11.2-14.1)  %


 


Plt Count  215    (150-350)  K/uL


 


Neut % (Auto)  72.9    (45.0-80.0)  %


 


Lymph % (Auto)  14.3    (10.0-50.0)  %


 


Mono % (Auto)  12.0    (2.0-14.0)  %


 


Eos % (Auto)  0.6    (0.0-5.0)  %


 


Baso % (Auto)  0.2    (0.0-2.0)  %


 


Neut # (Auto)  7.19 H    (1.40-7.00)  K/uL


 


Lymph # (Auto)  1.41    (0.50-3.50)  K/uL


 


Mono # (Auto)  1.18 H    (0.00-1.00)  K/uL


 


Eos # (Auto)  0.06    (0.00-0.50)  K/uL


 


Baso # (Auto)  0.02    (0.00-0.20)  K/uL


 


PT   10.3   (9.5-12.0)  SEC


 


INR   1.0   


 


APTT   29.9   (24.5-32.8)  SEC


 


Sodium    139  (136-145)  mmol/L


 


Potassium    3.9  (3.5-5.1)  mmol/L


 


Chloride    106  ()  mmol/L


 


Carbon Dioxide    26.2  (21.0-32.0)  mmol/L


 


BUN    13  (7-18)  mg/dL


 


Creatinine    0.90  (0.51-1.17)  mg/dL


 


Est Cr Clr Drug Dosing    90.78  mL/min


 


Estimated GFR (MDRD)    > 60  mL/min


 


Glucose    94  ()  mg/dL


 


Calcium    7.7 L  (8.5-10.1)  mg/dL


 


Magnesium    1.9  (1.8-2.4)  mg/dL


 


Total Bilirubin    0.4  (0.2-1.0)  mg/dL


 


AST    27  (15-37)  U/L


 


ALT    32  (12-78)  U/L


 


Alkaline Phosphatase    49  ()  IU/L


 


NT-Pro-B Natriuret Pep     (0-125)  pg/mL


 


Total Protein    5.8 L  (6.4-8.2)  g/dL


 


Albumin    2.7 L  (3.4-5.0)  g/dL


 


Amylase    25  ()  U/L


 


Lipase    54 L  ()  U/L














  04/26/20 Range/Units





  07:30 


 


WBC   (4.0-10.2)  K/uL


 


RBC   (4.33-5.41)  M/uL


 


Hgb   (13.1-16.8)  g/dL


 


Hct   (39.0-49.0)  %


 


MCV   (84.0-98.0)  fL


 


MCH   (28.2-33.3)  pg


 


MCHC   (31.7-36.0)  g/dL


 


RDW   (11.2-14.1)  %


 


Plt Count   (150-350)  K/uL


 


Neut % (Auto)   (45.0-80.0)  %


 


Lymph % (Auto)   (10.0-50.0)  %


 


Mono % (Auto)   (2.0-14.0)  %


 


Eos % (Auto)   (0.0-5.0)  %


 


Baso % (Auto)   (0.0-2.0)  %


 


Neut # (Auto)   (1.40-7.00)  K/uL


 


Lymph # (Auto)   (0.50-3.50)  K/uL


 


Mono # (Auto)   (0.00-1.00)  K/uL


 


Eos # (Auto)   (0.00-0.50)  K/uL


 


Baso # (Auto)   (0.00-0.20)  K/uL


 


PT   (9.5-12.0)  SEC


 


INR   


 


APTT   (24.5-32.8)  SEC


 


Sodium   (136-145)  mmol/L


 


Potassium   (3.5-5.1)  mmol/L


 


Chloride   ()  mmol/L


 


Carbon Dioxide   (21.0-32.0)  mmol/L


 


BUN   (7-18)  mg/dL


 


Creatinine   (0.51-1.17)  mg/dL


 


Est Cr Clr Drug Dosing   mL/min


 


Estimated GFR (MDRD)   mL/min


 


Glucose   ()  mg/dL


 


Calcium   (8.5-10.1)  mg/dL


 


Magnesium   (1.8-2.4)  mg/dL


 


Total Bilirubin   (0.2-1.0)  mg/dL


 


AST   (15-37)  U/L


 


ALT   (12-78)  U/L


 


Alkaline Phosphatase   ()  IU/L


 


NT-Pro-B Natriuret Pep  754 H  (0-125)  pg/mL


 


Total Protein   (6.4-8.2)  g/dL


 


Albumin   (3.4-5.0)  g/dL


 


Amylase   ()  U/L


 


Lipase   ()  U/L











Med Orders - Current: 


 Current Medications





Acetaminophen (Tylenol)  650 mg PO Q4H PRN


   PRN Reason: Pain (Mild 1-3)/fever


   Last Admin: 04/25/20 16:20 Dose:  650 mg


Calcium Carbonate/Glycine (Tums Extra Strength)  1,500 mg PO BID WING


   Last Admin: 04/26/20 08:04 Dose:  1,500 mg


Famotidine (Pepcid)  20 mg IVPUSH 0800,2000 [facility]


   Last Admin: 04/26/20 08:04 Dose:  20 mg


Furosemide (Lasix)  40 mg IVPUSH Q8H [facility]


Ceftriaxone Sodium 1 gm/ (Sodium Chloride)  100 mls @ 200 mls/hr IV Q12H [facility]


   Last Admin: 04/26/20 09:44 Dose:  200 mls/hr


Metronidazole 500 mg/ Premix  100 mls @ 100 mls/hr IV Q8H [facility]


   Last Admin: 04/26/20 02:14 Dose:  100 mls/hr


Magnesium Hydroxide (Milk Of Magnesia)  30 ml PO DAILY PRN


   PRN Reason: Constipation


   Last Admin: 04/26/20 04:12 Dose:  30 ml


Morphine Sulfate (Morphine)  2 mg IVPUSH Q2H PRN


   PRN Reason: Pain (severe 7-10)


Ondansetron HCl (Zofran)  4 mg IVPUSH Q6H PRN


   PRN Reason: Nausea/Vomiting


   Last Admin: 04/24/20 06:10 Dose:  4 mg


Pantoprazole Sodium (Protonix Iv***)  40 mg IVPUSH 0800,2000 [facility]


   Last Admin: 04/26/20 08:04 Dose:  40 mg


Sodium Chloride (Saline Flush)  10 ml FLUSH ASDIRECTED PRN


   PRN Reason: Keep Vein Open


   Last Admin: 04/26/20 09:45 Dose:  10 ml


Tramadol HCl (Ultram)  50 mg PO Q6H PRN


   PRN Reason: Pain (moderate 4-6)


   Last Admin: 04/26/20 02:18 Dose:  50 mg





Discontinued Medications





Al Hydroxide/Mg Hydroxide (Gi Cocktail)  30 ml PO ONETIME ONE


   Stop: 04/23/20 23:22


   Last Admin: 04/23/20 23:27 Dose:  30 ml


Bisacodyl (Dulcolax)  10 mg PO ONETIME ONE


   Stop: 04/26/20 03:33


   Last Admin: 04/26/20 04:12 Dose:  10 mg


Bupivacaine HCl/Epinephrine Bitart (Marcaine 0.25%/Epinephrine 1:200,000)  30 

ml INJECT .STK-MED ONE


   Stop: 04/24/20 13:51


   Last Admin: 04/24/20 13:50 Dose:  30 ml


Calcium Carbonate/Glycine (Tums Extra Strength)  750 mg PO Q2HR PRN


   PRN Reason: Indigestion


Cefazolin Sodium (Ancef)  2 gm IV .STK-MED ONE


   Stop: 04/24/20 13:41


   Last Admin: 04/24/20 13:40 Dose:  2 gm


Famotidine (Pepcid)  20 mg IVPUSH ONETIME ONE


   Stop: 04/24/20 00:37


   Last Admin: 04/24/20 01:40 Dose:  20 mg


Famotidine (Pepcid)  20 mg IVPUSH Q12H [facility]


   Last Admin: 04/25/20 11:21 Dose:  Not Given


Famotidine (Pepcid)  20 mg IVPUSH Q12H [facility]


   Last Admin: 04/25/20 11:29 Dose:  20 mg


Fentanyl (Sublimaze) Confirm Administered Dose 250 mcg .ROUTE .STK-MED ONE


   Stop: 04/24/20 12:50


   Last Admin: 04/24/20 18:56 Dose:  Not Given


Fentanyl (Sublimaze) Confirm Administered Dose 100 mcg .ROUTE .STK-MED ONE


   Stop: 04/24/20 16:16


   Last Admin: 04/24/20 18:56 Dose:  Not Given


Furosemide (Lasix)  60 mg IVPUSH NOW ONE


   Stop: 04/26/20 08:51


   Last Admin: 04/26/20 09:43 Dose:  60 mg


Hydromorphone HCl (Dilaudid)  1 mg IM ONETIME ONE


   Stop: 04/24/20 00:44


   Last Admin: 04/24/20 01:04 Dose:  1 mg


Sodium Chloride (Normal Saline)  1,000 mls @ 125 mls/hr IV ASDIRECTED [facility]


   Last Admin: 04/24/20 23:55 Dose:  125 mls/hr


Lactated Ringer's (Ringers, Lactated)  1,000 mls @ 75 mls/hr IV ASDIRECTED [facility]


   Last Admin: 04/24/20 12:35 Dose:  75 mls/hr


Piperacillin Sod/Tazobactam (Sod 3.375 gm/ Sodium Chloride)  100 mls @ 200 mls/

hr IV Q6H [facility]


   Stop: 04/25/20 05:29


   Last Admin: 04/25/20 05:04 Dose:  200 mls/hr


Sodium Chloride (Normal Saline)  1,000 mls @ 80 mls/hr IV ASDIRECTED [facility]


   Last Admin: 04/26/20 02:13 Dose:  80 mls/hr


Iopamidol (Isovue-300 (61%))  100 ml IVPUSH ONETIME ONE


   Stop: 04/24/20 01:21


   Last Admin: 04/24/20 02:03 Dose:  100 ml


Ketorolac Tromethamine (Toradol)  30 mg IVPUSH ONETIME ONE


   Stop: 04/24/20 17:28


   Last Admin: 04/24/20 17:35 Dose:  30 mg


Magnesium Citrate (Citrate Of Magnesia)  0 ml PO ONETIME ONE


   Stop: 04/26/20 09:16


   Last Admin: 04/26/20 09:44 Dose:  296 ml


Meperidine HCl (Demerol)  25 mg IVPUSH ONETIME ONE


   Stop: 04/24/20 12:10


   Last Admin: 04/24/20 12:35 Dose:  25 mg


Midazolam HCl (Versed 1 Mg/Ml) Confirm Administered Dose 2 mg .ROUTE .STK-MED 

ONE


   Stop: 04/24/20 12:50


   Last Admin: 04/24/20 18:56 Dose:  Not Given


Morphine Sulfate (Morphine)  4 mg IVPUSH ONETIME ONE


   Stop: 04/24/20 00:40


   Last Admin: 04/24/20 02:36 Dose:  Not Given


Ondansetron HCl (Zofran)  4 mg IVPUSH ONETIME ONE


   Stop: 04/24/20 00:40


   Last Admin: 04/24/20 02:37 Dose:  Not Given


Ondansetron HCl (Zofran Odt)  4 mg PO ONETIME ONE


   Stop: 04/24/20 00:45


   Last Admin: 04/24/20 01:03 Dose:  4 mg


Pantoprazole Sodium (Protonix Iv***)  80 mg IVPUSH .BOLUS ONE


   Stop: 04/24/20 00:37


   Last Admin: 04/24/20 01:40 Dose:  80 mg


Pantoprazole Sodium (Protonix Iv***)  40 mg IVPUSH Q12H [facility]


   Last Admin: 04/25/20 11:21 Dose:  Not Given


Pantoprazole Sodium (Protonix Iv***)  40 mg IVPUSH Q12H [facility]


   Last Admin: 04/25/20 11:29 Dose:  40 mg


Polyethylene Glycol (Miralax)  17 gm PO ONETIME ONE


   Stop: 04/26/20 09:16


   Last Admin: 04/26/20 09:44 Dose:  17 gm


Potassium Chloride (Klor-Con M20)  40 meq PO ONETIME ONE


   Stop: 04/26/20 08:52


   Last Admin: 04/26/20 09:39 Dose:  40 meq


Propofol (Diprivan  20 Ml) Confirm Administered Dose 200 mg .ROUTE .STK-MED ONE


   Stop: 04/24/20 12:50


   Last Admin: 04/24/20 18:56 Dose:  Not Given


Scopolamine (Transderm-Scop)  1.5 mg TRDERM Q72H ONE


   Stop: 04/24/20 12:44


   Last Admin: 04/24/20 12:55 Dose:  1.5 mg


Scopolamine (Transderm-Scop) Confirm Administered Dose 1.5 mg .ROUTE .STK-MED 

ONE


   Stop: 04/24/20 12:44


   Last Admin: 04/24/20 13:48 Dose:  Not Given


Sucralfate (Carafate)  1 gm PO ONETIME ONE


   Stop: 04/24/20 00:41


   Last Admin: 04/24/20 01:03 Dose:  1 gm











- Exam


Quality Assessment: Supplemental Oxygen, DVT Prophylaxis.  No: Central Line/PICC

, Urine Catheter, Skin Breakdown, Restraints


General: Alert, Oriented, No Acute Distress


HEENT: Pupils Equal, Pupils Reactive, EOMI, Mucous Membr. Moist/Pink, Other (He 

was wearing glasses).  No: Scleral Icterus


Neck: Supple, Trachea Midline, No JVD, No Thyromegaly.  No: Lymphadenopathy


Lungs: Rales (Mild bilateral basilar).  No: Decreased Breath Sounds, Rhonchi, 

Rub, Wheezing


Cardiovascular: Regular Rate, Regular Rhythm, No Murmurs.  No: Gallops, Rubs


GI/Abdominal Exam: No Organomegaly, No Abnormal Bruit, No Mass, Distended (

Persistent moderate), Tender (Mild diffuse palpation pain ), Abnormal Bowel 

Sounds (Diffuse decreased bowel sounds high-pitched in nature), Other (

Laparoscopic operative sites are intact clean and dry with mild ecchymosis).  No

: Guarding, Rigid, Rebound


 (Male) Exam: Deferred


Back Exam: Normal Inspection, Full Range of Motion.  No: CVA Tenderness (L), 

CVA Tenderness (R), Muscle Spasm


Extremities: Normal Inspection, Normal Range of Motion, Non-Tender, No Pedal 

Edema, Normal Capillary Refill.  No: Juan Pablo's Sign


Peripheral Pulses: 2+: Radial (L), Radial (R), Dorsalis Pedis (L), Dorsalis 

Pedis (R)


Skin: Ecchymosis (As above)


Wound/Incisions: Healing Well, Dressing Dry and Intact


Neurological: No New Focal Deficit, Other (No Clinical orthostasis)


Psy/Mental Status: Alert, Normal Affect, Normal Mood.  No: Agitated, 

Hallucinations, Withdrawal Symptoms





Sepsis Event Note





- Evaluation


Sepsis Screening Result: No Definite Risk





- Focused Exam


Vital Signs: 


 Vital Signs











  Temp Pulse Pulse Resp BP Pulse Ox


 


 04/26/20 04:00  37.4 C   65  16  101/54 L  94 L


 


 04/26/20 02:08  37.5 C  75   16  99/52 L  93 L











Date Exam was Performed: 04/26/20


Time Exam was Performed: 10:12





- Problem List & Annotations


(1) Acute calculous cholecystitis


SNOMED Code(s): 94786905739831


   Code(s): K80.00 - CALCULUS OF GALLBLADDER W ACUTE CHOLECYST W/O OBSTRUCTION 

  Status: Acute   Priority: High   Current Visit: Yes   Onset Date: 04/24/20   

Annotation/Comment:: Persistent abdominal distention with borderline beginning 

postoperative ileus by x-rays as above. Patient has not had a bowel movement to 

this point despite receiving Milk of Magnesia and Dulcolax in the early morning 

of 4/26. Magnesium citrate and MiraLAX mixture to be given in the a.m. of 4/26. 

Successful laparoscopic cholecystectomy with some difficulty on 4/24/20, 

however no complications, including significant blood loss, etc. during 

surgery. Note, however, progressive anemia during this hospitalization with 

hemoglobin of 14.5 on admission and 10.9 on 4/26, however overall stable 

hemoglobin since 4/25 with only a 1.1 g drop possibly secondary to rehydration 

effect. In addition, postoperative fever started on 4/25 and does persist 

today. He has been tolerating his diet well. Patient did receive IV Ancef 

postoperatively, however I did initiate additional IV Rocephin and IV Flagyl 

and 4/25 today. He is agreement with extended hospitalization with patient 

changed to inpatient status on 4/26 secondary to refractory symptoms as above. 

Continue Abdominal checks with vitals.   





(2) Anemia


SNOMED Code(s): 992189142


   Code(s): D64.9 - ANEMIA, UNSPECIFIED   Status: Acute   Current Visit: Yes   

Onset Date: 04/25/20   


Qualifiers: 


   Anemia type: other cause   Other causes of anemia: other cause, not 

classified   Qualified Code(s): D64.89 - Other specified anemias   


Annotation/Comment:: Postoperative anemia with 3.6 g drop in his hemoglobins 

since admission. No significant blood loss during his laparoscopic 

cholecystectomy as above. Observe for now. Further workup depending on his 

clinical course.   





(3) GERD with esophagitis


SNOMED Code(s): 622101996


   Code(s): K21.0 - GASTRO-ESOPHAGEAL REFLUX DISEASE WITH ESOPHAGITIS   Status: 

Chronic   Priority: Medium   Current Visit: Yes   Annotation/Comment:: Known 

history of Lyles's esophagitis. IV Pepcid and IV Protonix initiated on 4/25. 

Continue monitoring through his regular provider and GI with consideration of 

EGD depending on his clinical course.   





(4) Hiatal hernia


SNOMED Code(s): 26891859


   Code(s): K44.9 - DIAPHRAGMATIC HERNIA WITHOUT OBSTRUCTION OR GANGRENE   

Status: Chronic   Priority: Medium   Current Visit: Yes   Annotation/Comment:: 

Nissen fundoplication could not be conducted concurrently with laparoscopic 

cholecystectomy as above. He does plan to have this procedure later this summer

, however, by his history.   





(5) Hypoalbuminemia


SNOMED Code(s): 124755376


   Code(s): E88.09 - OTH DISORDERS OF PLASMA-PROTEIN METABOLISM, NEC   Status: 

Acute   Priority: Medium   Current Visit: Yes   Onset Date: 04/25/20   

Annotation/Comment:: Observe for now.   





(6) Hypocalcemia


SNOMED Code(s): 3219817


   Code(s): E83.51 - HYPOCALCEMIA   Status: Acute   Priority: Medium   Current 

Visit: Yes   Onset Date: 04/25/20   Annotation/Comment:: Continue Tums for now.

   





- Problem List Review


Problem List Initiated/Reviewed/Updated: Yes





- My Orders


Last 24 Hours: 


My Active Orders





04/25/20 09:42


Communication Order [RC] ROUTINE 





04/25/20 09:44


RT Incentive Spirometry [RC] ASDIRECTED 





04/25/20 09:46


Acetaminophen [Tylenol]   650 mg PO Q4H PRN 


traMADol [Ultram]   50 mg PO Q6H PRN 





04/25/20 10:00


cefTRIAXone [Rocephin] 1 gm   Sodium Chloride 0.9% [Normal Saline] 100 ml IV 

Q12H 





04/25/20 11:00


metroNIDAZOLE/Normal Saline [Flagyl 500 MG in  ML] 500 mg   Premix Bag 1 

bag IV Q8H 





04/25/20 11:13


Calcium Carbonate [Tums Extra Strength]   1,500 mg PO BID 





04/25/20 20:00


Famotidine [Pepcid]   20 mg IVPUSH 0800,2000 


Pantoprazole [ProTONIX IV***]   40 mg IVPUSH 0800,2000 04/26/20 03:32


Magnesium Hydroxide [Milk of Magnesia]   30 ml PO DAILY PRN 





04/26/20 05:11


Abdomen Series w Chest 1V [CR] Routine 





04/26/20 09:00


Admission Status [Patient Status] [ADT] Routine 





04/26/20 17:00


Furosemide [Lasix]   40 mg IVPUSH Q8H 





04/27/20 05:11


Abdomen Series w Chest 1V [CR] Routine 


BASIC METABOLIC PANEL,BMP [CHEM] Routine 


CBC WITH AUTO DIFF [HEME] Routine 


PRO B-TYPE NATRIUR PEPT,BNPPRO [CHEM] Routine 














- Assessment


Assessment:: 





As above





- Plan


Plan:: 





As above. Extensive precautions were given to the patient, who is in agreement 

with the treatment plan. Change the patient to inpatient status as above with 

probable patient discharge in the a.m. Note extended hospitalization secondary 

to multiple complications as above.

## 2020-04-27 VITALS — SYSTOLIC BLOOD PRESSURE: 113 MMHG | HEART RATE: 74 BPM | DIASTOLIC BLOOD PRESSURE: 64 MMHG

## 2020-04-27 LAB
CHLORIDE SERPL-SCNC: 103 MMOL/L (ref 98–107)
SODIUM SERPL-SCNC: 140 MMOL/L (ref 136–145)

## 2020-04-27 RX ADMIN — Medication PRN ML: at 03:44

## 2020-04-27 RX ADMIN — Medication PRN ML: at 00:17

## 2020-04-27 RX ADMIN — METRONIDAZOLE SCH MLS/HR: 500 SOLUTION INTRAVENOUS at 03:45

## 2020-04-27 RX ADMIN — Medication PRN ML: at 07:49

## 2020-04-27 NOTE — [id number].[id number]
**Discharge Summary





- Hospital Course


HPI Initial Comments: 





See emergency room note/admission H&P


Brief History: See emergency room note/admission H&P


Diagnosis: Stroke: No


Modified Kelsi Scale: Slight Disable;Unable to Carry Out Prev Act.Able to Look 

After Affairs


Modified Kelsi Scale Score: 2





- Discharge Data


Discharge Date: 04/27/20


Discharge Disposition: Home, Self-Care 01


Condition: Good





- Referral to Home Health


Primary Care Physician: 


Sheryl Martinez NP








- Discharge Diagnosis/Problem(s)


(1) Acute calculous cholecystitis


SNOMED Code(s): 06947071079053


   ICD Code: K80.00 - CALCULUS OF GALLBLADDER W ACUTE CHOLECYST W/O OBSTRUCTION

   Status: Acute   Priority: High   Current Visit: Yes   Onset Date: 04/24/20   

Problem Details: Excellent results with Kayexalate and magnesium citrate 

preparation with normal bowel movements, including this morning. Continually 

improved minimal postoperative pain with no nausea, melena, hematochezia, nausea

, emesis, etc. Note persistent abdominal distention with borderline beginning 

postoperative ileus by x-rays on 4/26 with resolution of abdominal distention 

and improved abdominal x-rays today. Patient did not have a bowel movement 

after his MiraLAX and magnesium citrate were given as above. Note previous Milk 

of Magnesia and Dulcolax in the early morning of 4/26. Successful laparoscopic 

cholecystectomy with some difficulty on 4/24/20, however no complications, 

including significant blood loss, etc. during surgery. Note, however, 

progressive anemia during this hospitalization with hemoglobin of 14.5 on 

admission and 10.9 on 4/26, however overall stable hemoglobin since 4/25 with 

only a 1.1 g drop possibly secondary to rehydration effect. Note improved 

hemoglobin of 11.9 today after diuresis with IV Lasix with no evidence of 

significant postoperative bleed, etc. In addition, postoperative fever started 

on 4/25 and does still persist today. He will be discharged with Augmentin and 

Flagyl with follow-up with Dr. Cheng on 5/1 per discharge instructions. 

The patient continues to tolerate his diet well. Patient did receive IV Ancef 

postoperatively, however I did initiate additional IV Rocephin and IV Flagyl 

and 4/25 today. He is agreement with extended hospitalization with patient 

changed to inpatient status on 4/26 secondary to refractory symptoms as above. 

Abdominal checks with vitals have been stable.   





(2) Anemia


SNOMED Code(s): 792688528


   ICD Code: D64.9 - ANEMIA, UNSPECIFIED   Status: Acute   Current Visit: Yes   

Onset Date: 04/25/20   Problem Details: Postoperative anemia with 3.6 g drop in 

his hemoglobin during this hospitalization, however improved as above. No 

significant blood loss during his laparoscopic cholecystectomy as above. 

Observe for now. Further workup depending on his clinical course.   


Qualifiers: 


   Anemia type: other cause   Other causes of anemia: other cause, not 

classified   Qualified Code(s): D64.89 - Other specified anemias   





(3) GERD with esophagitis


SNOMED Code(s): 698011523


   ICD Code: K21.0 - GASTRO-ESOPHAGEAL REFLUX DISEASE WITH ESOPHAGITIS   Status

: Chronic   Priority: Medium   Current Visit: Yes   Problem Details: Known 

history of Lyles's esophagitis. IV Pepcid and IV Protonix initiated on 4/25. 

Continue monitoring through his regular provider and GI with consideration of 

EGD depending on his clinical course.   





(4) Hiatal hernia


SNOMED Code(s): 90518164


   ICD Code: K44.9 - DIAPHRAGMATIC HERNIA WITHOUT OBSTRUCTION OR GANGRENE   

Status: Chronic   Priority: Medium   Current Visit: Yes   Problem Details: 

Nissen fundoplication could not be conducted concurrently with laparoscopic 

cholecystectomy as above. He does plan to have this procedure later this summer

, however, by his history.   





(5) Hypoalbuminemia


SNOMED Code(s): 913810596


   ICD Code: E88.09 - H DISORDERS OF PLASMA-PROTEIN METABOLISM, NEC   Status: 

Acute   Priority: Medium   Current Visit: Yes   Onset Date: 04/25/20   Problem 

Details: Observe for now.   





(6) Hypocalcemia


SNOMED Code(s): 0184109


   ICD Code: E83.51 - HYPOCALCEMIA   Status: Acute   Priority: Medium   Current 

Visit: Yes   Onset Date: 04/25/20   Problem Details: Continue Tums for now with 

improved calcium prior to discharge.   





(7) Fluid overload


SNOMED Code(s): 40871362


   ICD Code: E87.70 - FLUID OVERLOAD, UNSPECIFIED   Status: Acute   Priority: 

High   Current Visit: Yes   Onset Date: ~04/25/20   Problem Details: Mild fluid 

overload secondary to IV fluids with initiation of IV Lasix and oral potassium 

supplementation on 4/27 with excellent results. No chest pain or anginal 

complaints or evidence of significant CHF   


Qualifiers: 


   Hypervolemia type: other   Qualified Code(s): E87.79 - Other fluid overload 

  





- Patient Summary/Data


Operative Procedure(s) Performed: Laparoscopic Cholecystectomy


Complications: 1. Post operative anemia as above.  2. Post operative abdominal 

infection with secondary ileus.  3. Fluid overload with no anginal complaints 

or significant clinical CHF


Consults: 


Surgical consultation with Ge Cheng MD





Labs Pending at D/C: 





Final x-ray reports of acute abdominal x-rays on 4/26/20 and 4/27/20


Recommended Follow-up Testing/Procedures: 





As per discharge orders


Planned Operative Procedure(s) after DC: As per discharge orders


Hospital Course: 





The patient was initially placed in observation status for laparoscopic 

cholecystectomy with subsequent minor postoperative complications as above, 

which this did result in an extended hospital stay with the patient to be 

transferred from observation status to acute/inpatient care on 4/26. Otherwise 

the treatment as above. Patient's clinical exam and vital signs were much 

improved at time of discharge.





- Patient Instructions


Diet: Heart Healthy Diet


Activity: No Lifting Over 10 Pounds, No Strenuous Activities


Driving: May Drive Today


Showering/Bathing: May Shower


Wound/Incision Care: Keep Operative Site/Wound Site Clean and Dry, Change 

Dressing Daily


Notify Provider of: Fever, Increased Pain, Swelling and Redness, Drainage, 

Nausea and/or Vomiting


Other/Special Instructions: 1.  Follow-up with Ge Cheng M.D. on 5/1 at 

10:00 a.m. as already scheduled with recommended CBC, comprehensive metabolic 

panel, BNP, and acute abdominal x-rays at that time.  2.  Tylenol 650 mg by 

mouth every 4 hours and/or OTC ibuprofen 2-3 tabs by mouth every 6 hours with 

food as directed./needed. You may stagger these medications for 48-72 hours only

, which essentially means that you are receiving a pain medication about every 

2 hours.  3.  Immediately after this visit verify that your cellular telephone'

s voicemail has been activated and is empty. Also verify that your  home 

telephone's answering machine is operating properly and has space to receive 

messages. Note that it is sometimes necessary for us to be able to contact you 

at a later date to discuss your medical care.  4.   Please remember that we are 

ALWAYS here for you and want to answer any questions you may have. Feel free to 

call the hospital any time and we call you back ASAP.  SYMPTOMS TO LOOK OUT FOR

:  You have been hospitalized for your gallbladder disease and should look out 

for the following symptoms after discharge:  1.  Make absolutely certain that 

you completely  understand the reasons you are taking any of your new and/or 

old medications and/or supplements as discussed with you by the nurse at time 

of discharge. This includes possible side effects versus interactions between 

your medications and/or supplements. Don't be afraid to take extra time to ask 

any questions or express any concerns, because that is what we are here for. It 

is very important to us that  you understand your care.  2. Notify this facility

, telephone number [phone], and/or your regular provider ASAP if you 

experience any of the following symptoms:  a.  Any increased abdominal pain, 

heartburn, nausea, vomiting, etc., which has changed since sure hospital 

discharge and is not responding to medications and/or supplements you have been 

prescribed.  b.  Any change in bowel habits, including more than 5 watery 

stools per day, severe constipation with no excellent bowel movements every 2 

days after discharge, and/or any change in your normal bowel habits.  c.  Any 

sudden onset of change of your stool appearance, including blood, black tarry 

stools, etc. as discussed at discharge.  d.  Any persistent fever equal to or 

greater than 100.5, which does not respond to recommended doses of Tylenol, 

ibuprofen, Aleve, or other previously prescribed  fever medications.  SYMPTOMS 

TO LOOK OUT FOR:  You have had a mild fluid overload during this 

hospitalization and should look out for the following symptoms after discharge:

  1.  Make absolutely certain that you completely  understand the reasons you 

are taking any of your new and/or old medications and/or supplements as 

discussed with you by the nurse at time of discharge. This includes possible 

side effects versus interactions between your medications and/or supplements. 

Don't be afraid to take extra time to ask any questions or express any concerns

, because that is what we are here for. It is very important to us that  you 

understand your care.  2. Notify this facility, telephone number [phone], 

and/or your regular provider ASAP if you experience any of the following 

symptoms:  a.  Sudden chest pressure or pain especially with radiation to the 

neck, jaws, arms, mid back, etc. especially if these are associated with nausea

, cold sweats, dizziness, racing heart, weakness, near fainting, etc.  b.  Any 

shortness of breath or decreased exercise tolerance that has changed since your

  hospital discharge and is not normal for you.  c.  Any persistent heartburn 

type symptoms that is not normal for you and is not relieved by any of your 

discharge medications or supplements.  d.  Any progressive weight gain 

especially more than 5 pounds of weight gain prior to your scheduled 

appointment with your regular provider.  e.  Any racing heart, dizziness, etc. 

not associated with the other symptoms as above.  





- Discharge Plan


*PRESCRIPTION DRUG MONITORING PROGRAM REVIEWED*: Not Applicable


*COPY OF PRESCRIPTION DRUG MONITORING REPORT IN PATIENT ARIANE: Not Applicable


Prescriptions/Med Rec: 


Amoxicillin/Potassium Clav [Augmentin 875-125 Tablet] 1 each PO BIDMEALS #14 

tablet


Furosemide [Lasix] 20 mg PO DAILY #7 tab


metroNIDAZOLE [Flagyl] 500 mg PO TIDMEALS #21 tablet


Potassium Chloride 20 meq PO DAILY #7 tablet.er


Home Medications: 


 Home Meds





Pantoprazole Sodium [Protonix] 40 mg PO DAILY 09/06/18 [History]


Acetaminophen [Tylenol] 650 mg PO Q4H PRN  tablet 04/27/20 [Rx]


Amoxicillin/Potassium Clav [Augmentin 875-125 Tablet] 1 each PO BIDMEALS #14 

tablet 04/27/20 [Rx]


Calcium Carbonate [Tums Extra Strength] 1,500 mg PO BID  tab.chew 04/27/20 [Rx]


Furosemide [Lasix] 20 mg PO DAILY #7 tab 04/27/20 [Rx]


Potassium Chloride 20 meq PO DAILY #7 tablet.er 04/27/20 [Rx]


metroNIDAZOLE [Flagyl] 500 mg PO TIDMEALS #21 tablet 04/27/20 [Rx]








Oxygen Therapy Mode: Room Air


Patient Handouts:  Potassium chloride tablets, extended-release tablets or 

capsules, Furosemide injection, Ceftriaxone injection, Laparoscopic 

Cholecystectomy, Metronidazole injection, Magnesium Citrate oral solution, 

Polyethylene Glycol powder


Forms:  ED Department Discharge


Referrals: 


Sheryl Martinez NP [Primary Care Provider] - 





- Discharge Summary/Plan Comment


DC Time >30 min.: Yes (Coordination of care )


Discharge Summary/Plan Comment: 





As above, Extensive precautions were given to the patient, who is in agreement 

with the treatment plan. See Patient Instructions for further treatment and 

plan.





- General Info


Date of Service: 04/27/20


Admission Dx/Problem (Free Text: 





Cholecystitis


Functional Status: Reports: Pain Controlled, Tolerating Diet, Ambulating, 

Urinating, Incentive Spirometry.  Denies: New Symptoms


Numeric/FACES Score: 1





- Review of Systems


General: Reports: Fever.  Denies: Weakness, Fatigue, Malaise, Chills, Night 

Sweats, Appetite (Good), Other


HEENT: Reports: Glasses.  Denies: Dysphasia, Ear Pain, Eye Pain, Headaches, 

Post Nasal Drip, Sinus Congestion, Sore Throat, Rhinitis, Visual Changes


Pulmonary: Reports: No Symptoms.  Denies: Shortness of Breath, Pleuritic Chest 

Pain, Cough, Sputum, Hemoptysis, Wheezing


Cardiovascular: Reports: No Symptoms.  Denies: Chest Pain, Palpitations, 

Dyspnea on Exertion, Orthopnea, PND, Edema, Lightheadedness


Gastrointestinal: Reports: Abdominal Pain (Improving postoperative).  Denies: 

Constipation, Decreased Appetite, Diarrhea, Difficulty Swallowing, Flatus, 

Hematochezia, Melena, Nausea, Vomiting


Genitourinary: Reports: No Symptoms.  Denies: Dysuria, Frequency, Burning, Pain

, Urgency, Incontinence, Hematuria, Retention, Flank Pain


Musculoskeletal: Reports: No Symptoms.  Denies: Neck Pain, Shoulder Pain, Arm 

Pain, Back Pain, Leg Pain


Skin: Reports: Bruising (Improving).  Denies: Jaundice, Pallor, Diaphoresis, 

Pruritis, Rash


Neurological: Reports: No Symptoms.  Denies: Confusion, Dizziness, Headache, 

Numbness, Paresthesia, Tingling, Weakness


Psychiatric: Reports: No Symptoms.  Denies: Confusion, Depression, Anxiety, 

Agitation, Cravings, Hallucinations





- Patient Data


Vitals - Most Recent: 


 Last Vital Signs











Temp  37.3 C   04/27/20 07:25


 


Pulse  74   04/27/20 07:25


 


Resp  16   04/27/20 07:25


 


BP  113/64   04/27/20 07:25


 


Pulse Ox  95   04/27/20 07:25








 Vital Signs - 24 hr











  04/26/20 04/26/20 04/26/20





  12:00 16:00 19:06


 


Temperature [ 37.6 C  37.4 C





Oral]   


 


Temperature [  37.1 C 





Temporal]   


 


Pulse, 56 L 68 





Peripheral [   





Left Pulse   





Oximetry]   


 


Pulse,   70





Peripheral [   





Right Pulse   





Oximetry]   


 


Respiratory 16 18 18





Rate   


 


Blood Pressure 111/74 109/68 110/67





[Left Upper Arm   





]   


 


O2 Sat by Pulse 94 L 96 95





Oximetry   














  04/27/20 04/27/20 04/27/20





  00:00 03:43 07:25


 


Temperature [ 37.4 C 37.1 C 37.3 C





Oral]   


 


Temperature [   





Temporal]   


 


Pulse,   





Peripheral [   





Left Pulse   





Oximetry]   


 


Pulse, 75 70 74





Peripheral [   





Right Pulse   





Oximetry]   


 


Respiratory 18 18 16





Rate   


 


Blood Pressure 101/59 L 100/60 113/64





[Left Upper Arm   





]   


 


O2 Sat by Pulse 95 96 95





Oximetry   











Weight - Most Recent: 77.292 kg


I&O - Last 24 hours: 


 Intake & Output











 04/26/20 04/27/20 04/27/20





 22:59 06:59 14:59


 


Intake Total 440 600 


 


Output Total 2200 1500 


 


Balance -1760 -900 











Imaging Impressions - Last 24 hrs: 





Acute abdominal x-rays on 4/26 show mild to moderate cardiomegaly with evidence 

of some fluid overload, including some Kerley B lines. Mild aortic valve 

calcification with no pulmonary infiltrates, pneumothorax, etc. Moderately 

elevated right hemidiaphragm. Occasional diffuse fluid levels consistent with 

possible beginning ileus with additional mild free air secondary to recent 

laparoscopic cholecystectomy.





Acute abdominal x-rays on 4/27 show stable to improved mild cardiomegaly with 

significantly improved fluid overload including Kerley B lines. Stable mild 

aortic valve calcification with no pulmonary infiltrates, pneumothorax, etc. 

Stable moderately elevated right hemidiaphragm. Only borderline occasional 

fluid levels with no evidence of free air, ileus, obstruction, etc. Surgical 

clips noted in right upper quadrant consistent with recent laparoscopic 

cholecystectomy with persistent mild to moderate nonspecific increased bowel 

gaseous pattern.


Lab Results - Last 24 hrs: 


 Laboratory Results - last 24 hr











  04/26/20 04/27/20 04/27/20 Range/Units





  07:30 07:15 07:15 


 


WBC   8.2   (4.0-10.2)  K/uL


 


RBC   4.03 L   (4.33-5.41)  M/uL


 


Hgb   11.9 L   (13.1-16.8)  g/dL


 


Hct   36.1 L   (39.0-49.0)  %


 


MCV   89.6   (84.0-98.0)  fL


 


MCH   29.5   (28.2-33.3)  pg


 


MCHC   33.0   (31.7-36.0)  g/dL


 


RDW   13.1   (11.2-14.1)  %


 


Plt Count   243   (150-350)  K/uL


 


Neut % (Auto)   62.9   (45.0-80.0)  %


 


Lymph % (Auto)   21.8   (10.0-50.0)  %


 


Mono % (Auto)   12.8   (2.0-14.0)  %


 


Eos % (Auto)   2.1   (0.0-5.0)  %


 


Baso % (Auto)   0.4   (0.0-2.0)  %


 


Neut # (Auto)   5.13   (1.40-7.00)  K/uL


 


Lymph # (Auto)   1.78   (0.50-3.50)  K/uL


 


Mono # (Auto)   1.04 H   (0.00-1.00)  K/uL


 


Eos # (Auto)   0.17   (0.00-0.50)  K/uL


 


Baso # (Auto)   0.03   (0.00-0.20)  K/uL


 


Sodium    140  (136-145)  mmol/L


 


Potassium    3.6  (3.5-5.1)  mmol/L


 


Chloride    103  ()  mmol/L


 


Carbon Dioxide    26.9  (21.0-32.0)  mmol/L


 


BUN    14  (7-18)  mg/dL


 


Creatinine    0.93  (0.51-1.17)  mg/dL


 


Est Cr Clr Drug Dosing    87.85  mL/min


 


Estimated GFR (MDRD)    > 60  mL/min


 


Glucose    100  ()  mg/dL


 


Calcium    8.2 L  (8.5-10.1)  mg/dL


 


NT-Pro-B Natriuret Pep  754 H   459 H  (0-125)  pg/mL











[facility] Results - Last 24 hrs: 


 Microbiology











 04/26/20 10:27 Stool Occult Blood ([facility]) - Final





 Stool / Feces    NEGATIVE OCCULT BLOOD





    REFERENCE RANGE: NEGATIVE











Med Orders - Current: 


 Current Medications





Acetaminophen (Tylenol)  650 mg PO Q4H PRN


   PRN Reason: Pain (Mild 1-3)/fever


   Last Admin: 04/27/20 07:48 Dose:  650 mg


Calcium Carbonate/Glycine (Tums Extra Strength)  1,500 mg PO BID [facility]


   Last Admin: 04/27/20 07:47 Dose:  1,500 mg


Famotidine (Pepcid)  20 mg IVPUSH 0800,2000 [facility]


   Last Admin: 04/27/20 07:47 Dose:  20 mg


Furosemide (Lasix)  40 mg IVPUSH Q8H [facility]


   Last Admin: 04/27/20 00:17 Dose:  40 mg


Ceftriaxone Sodium 1 gm/ (Sodium Chloride)  100 mls @ 200 mls/hr IV Q12H [facility]


   Last Admin: 04/26/20 22:14 Dose:  200 mls/hr


Metronidazole 500 mg/ Premix  100 mls @ 100 mls/hr IV Q8H [facility]


   Last Admin: 04/27/20 03:45 Dose:  100 mls/hr


Magnesium Hydroxide (Milk Of Magnesia)  30 ml PO DAILY PRN


   PRN Reason: Constipation


   Last Admin: 04/26/20 04:12 Dose:  30 ml


Morphine Sulfate (Morphine)  2 mg IVPUSH Q2H PRN


   PRN Reason: Pain (severe 7-10)


Ondansetron HCl (Zofran)  4 mg IVPUSH Q6H PRN


   PRN Reason: Nausea/Vomiting


   Last Admin: 04/24/20 06:10 Dose:  4 mg


Pantoprazole Sodium (Protonix Iv***)  40 mg IVPUSH 0800,2000 [facility]


   Last Admin: 04/27/20 07:50 Dose:  40 mg


Sodium Chloride (Saline Flush)  10 ml FLUSH ASDIRECTED PRN


   PRN Reason: Keep Vein Open


   Last Admin: 04/27/20 07:49 Dose:  10 ml


Tramadol HCl (Ultram)  50 mg PO Q6H PRN


   PRN Reason: Pain (moderate 4-6)


   Last Admin: 04/26/20 02:18 Dose:  50 mg





Discontinued Medications





Al Hydroxide/Mg Hydroxide (Gi Cocktail)  30 ml PO ONETIME ONE


   Stop: 04/23/20 23:22


   Last Admin: 04/23/20 23:27 Dose:  30 ml


Bisacodyl (Dulcolax)  10 mg PO ONETIME ONE


   Stop: 04/26/20 03:33


   Last Admin: 04/26/20 04:12 Dose:  10 mg


Bupivacaine HCl/Epinephrine Bitart (Marcaine 0.25%/Epinephrine 1:200,000)  30 

ml INJECT .STK-MED ONE


   Stop: 04/24/20 13:51


   Last Admin: 04/24/20 13:50 Dose:  30 ml


Calcium Carbonate/Glycine (Tums Extra Strength)  750 mg PO Q2HR PRN


   PRN Reason: Indigestion


Cefazolin Sodium (Ancef)  2 gm IV .STK-MED ONE


   Stop: 04/24/20 13:41


   Last Admin: 04/24/20 13:40 Dose:  2 gm


Famotidine (Pepcid)  20 mg IVPUSH ONETIME ONE


   Stop: 04/24/20 00:37


   Last Admin: 04/24/20 01:40 Dose:  20 mg


Famotidine (Pepcid)  20 mg IVPUSH Q12H [facility]


   Last Admin: 04/25/20 11:21 Dose:  Not Given


Famotidine (Pepcid)  20 mg IVPUSH Q12H [facility]


   Last Admin: 04/25/20 11:29 Dose:  20 mg


Fentanyl (Sublimaze) Confirm Administered Dose 250 mcg .ROUTE .STK-MED ONE


   Stop: 04/24/20 12:50


   Last Admin: 04/24/20 18:56 Dose:  Not Given


Fentanyl (Sublimaze) Confirm Administered Dose 100 mcg .ROUTE .STK-MED ONE


   Stop: 04/24/20 16:16


   Last Admin: 04/24/20 18:56 Dose:  Not Given


Furosemide (Lasix)  60 mg IVPUSH NOW ONE


   Stop: 04/26/20 08:51


   Last Admin: 04/26/20 09:43 Dose:  60 mg


Hydromorphone HCl (Dilaudid)  1 mg IM ONETIME ONE


   Stop: 04/24/20 00:44


   Last Admin: 04/24/20 01:04 Dose:  1 mg


Sodium Chloride (Normal Saline)  1,000 mls @ 125 mls/hr IV ASDIRECTED [facility]


   Last Admin: 04/24/20 23:55 Dose:  125 mls/hr


Lactated Ringer's (Ringers, Lactated)  1,000 mls @ 75 mls/hr IV ASDIRECTED [facility]


   Last Admin: 04/24/20 12:35 Dose:  75 mls/hr


Piperacillin Sod/Tazobactam (Sod 3.375 gm/ Sodium Chloride)  100 mls @ 200 mls/

hr IV Q6H [facility]


   Stop: 04/25/20 05:29


   Last Admin: 04/25/20 05:04 Dose:  200 mls/hr


Sodium Chloride (Normal Saline)  1,000 mls @ 80 mls/hr IV ASDIRECTED [facility]


   Last Admin: 04/26/20 02:13 Dose:  80 mls/hr


Iopamidol (Isovue-300 (61%))  100 ml IVPUSH ONETIME ONE


   Stop: 04/24/20 01:21


   Last Admin: 04/24/20 02:03 Dose:  100 ml


Ketorolac Tromethamine (Toradol)  30 mg IVPUSH ONETIME ONE


   Stop: 04/24/20 17:28


   Last Admin: 04/24/20 17:35 Dose:  30 mg


Magnesium Citrate (Citrate Of Magnesia)  0 ml PO ONETIME ONE


   Stop: 04/26/20 09:16


   Last Admin: 04/26/20 09:44 Dose:  296 ml


Meperidine HCl (Demerol)  25 mg IVPUSH ONETIME ONE


   Stop: 04/24/20 12:10


   Last Admin: 04/24/20 12:35 Dose:  25 mg


Midazolam HCl (Versed 1 Mg/Ml) Confirm Administered Dose 2 mg .ROUTE .STK-MED 

ONE


   Stop: 04/24/20 12:50


   Last Admin: 04/24/20 18:56 Dose:  Not Given


Morphine Sulfate (Morphine)  4 mg IVPUSH ONETIME ONE


   Stop: 04/24/20 00:40


   Last Admin: 04/24/20 02:36 Dose:  Not Given


Ondansetron HCl (Zofran)  4 mg IVPUSH ONETIME ONE


   Stop: 04/24/20 00:40


   Last Admin: 04/24/20 02:37 Dose:  Not Given


Ondansetron HCl (Zofran Odt)  4 mg PO ONETIME ONE


   Stop: 04/24/20 00:45


   Last Admin: 04/24/20 01:03 Dose:  4 mg


Pantoprazole Sodium (Protonix Iv***)  80 mg IVPUSH .BOLUS ONE


   Stop: 04/24/20 00:37


   Last Admin: 04/24/20 01:40 Dose:  80 mg


Pantoprazole Sodium (Protonix Iv***)  40 mg IVPUSH Q12H [facility]


   Last Admin: 04/25/20 11:21 Dose:  Not Given


Pantoprazole Sodium (Protonix Iv***)  40 mg IVPUSH Q12H [facility]


   Last Admin: 04/25/20 11:29 Dose:  40 mg


Polyethylene Glycol (Miralax)  17 gm PO ONETIME ONE


   Stop: 04/26/20 09:16


   Last Admin: 04/26/20 09:44 Dose:  17 gm


Potassium Chloride (Klor-Con M20)  40 meq PO ONETIME ONE


   Stop: 04/26/20 08:52


   Last Admin: 04/26/20 09:39 Dose:  40 meq


Propofol (Diprivan  20 Ml) Confirm Administered Dose 200 mg .ROUTE .STK-MED ONE


   Stop: 04/24/20 12:50


   Last Admin: 04/24/20 18:56 Dose:  Not Given


Scopolamine (Transderm-Scop)  1.5 mg TRDERM Q72H ONE


   Stop: 04/24/20 12:44


   Last Admin: 04/24/20 12:55 Dose:  1.5 mg


Scopolamine (Transderm-Scop) Confirm Administered Dose 1.5 mg .ROUTE .STK-MED 

ONE


   Stop: 04/24/20 12:44


   Last Admin: 04/24/20 13:48 Dose:  Not Given


Sucralfate (Carafate)  1 gm PO ONETIME ONE


   Stop: 04/24/20 00:41


   Last Admin: 04/24/20 01:03 Dose:  1 gm











- Exam


Quality Assessment: Reports: DVT Prophylaxis.  Denies: Supplemental Oxygen, 

Urine Catheter, Skin Breakdown, Restraints


General: Reports: Alert, Oriented, Cooperative, No Acute Distress


HEENT: Reports: Pupils Equal, Pupils Reactive, EOMI, Mucous Membr. Moist/Pink.  

Denies: Scleral Icterus


Neck: Reports: Supple, No JVD, No Thyromegaly, +2 Carotid Pulse wo Bruit.  

Denies: Lymphadenopathy


Lungs: Reports: Clear to Auscultation, Normal Respiratory Effort.  Denies: Rales

, Rhonchi, Rub, Wheezing


Cardiovascular: Reports: Regular Rate, Regular Rhythm, No Murmurs.  Denies: 

Gallops, Rubs


GI/Abdominal Exam: Soft, Non-Tender, No Organomegaly, No Distention, No 

Abnormal Bruit, No Mass, Abnormal Bowel Sounds (Persistent somewhat diffuse 

increased bowel sounds but not high-pitched in nature), Other (Abdominal 

incisions improved as below).  No: Guarding, Rebound


 (Male) Exam: Deferred


Rectal (Males) Exam: Deferred


Back Exam: Reports: Normal Inspection, Full Range of Motion.  Denies: CVA 

Tenderness (L), CVA Tenderness (R), Muscle Spasm


Extremities: Normal Inspection, Normal Range of Motion, Non-Tender, No Pedal 

Edema, Normal Capillary Refill.  No: Juan Pablo's Sign


Skin: Reports: Ecchymosis (Postoperative improving)


Wound/Incisions: Reports: Healing Well, Dressing Dry and Intact, No Drainage


Neurological: Reports: No New Focal Deficit


Psy/Mental Status: Reports: Alert, Normal Affect, Normal Mood.  Denies: Anxious

, Depressed, Agitated, Hallucinations, Withdrawal Symptoms





Discharge Operative/Procedures





- Procedures Performed


Operations: Laparoscopic cholecystectomy


Intubation Indication: Airway Protection, Other (Operative)


Operations/Procedure Comment: 





As above. See surgical report.

## 2020-04-27 NOTE — [email or web address]
- Free Text/Narrative


Note: 





Addition of laboratories and microscopic test results from the entire 

hospitalization to recent discharge summary from 4/27/20:





 Laboratory Tests











  04/23/20 04/23/20 04/23/20 Range/Units





  23:22 23:22 23:22 


 


WBC  9.7    (4.0-10.2)  K/uL


 


RBC  4.92    (4.33-5.41)  M/uL


 


Hgb  14.5    (13.1-16.8)  g/dL


 


Hct  43.5    (39.0-49.0)  %


 


MCV  88.4    (84.0-98.0)  fL


 


MCH  29.5    (28.2-33.3)  pg


 


MCHC  33.3    (31.7-36.0)  g/dL


 


RDW  13.2    (11.2-14.1)  %


 


Plt Count  286    (150-350)  K/uL


 


Neut % (Auto)  63.5    (45.0-80.0)  %


 


Lymph % (Auto)  25.4    (10.0-50.0)  %


 


Mono % (Auto)  9.1    (2.0-14.0)  %


 


Eos % (Auto)  1.7    (0.0-5.0)  %


 


Baso % (Auto)  0.3    (0.0-2.0)  %


 


Neut # (Auto)  6.17    (1.40-7.00)  K/uL


 


Lymph # (Auto)  2.47    (0.50-3.50)  K/uL


 


Mono # (Auto)  0.88    (0.00-1.00)  K/uL


 


Eos # (Auto)  0.17    (0.00-0.50)  K/uL


 


Baso # (Auto)  0.03    (0.00-0.20)  K/uL


 


PT     (9.5-12.0)  SEC


 


INR     


 


APTT     (24.5-32.8)  SEC


 


Sodium   140   (136-145)  mmol/L


 


Potassium   3.9   (3.5-5.1)  mmol/L


 


Chloride   102   ()  mmol/L


 


Carbon Dioxide   29.7   (21.0-32.0)  mmol/L


 


BUN   11   (7-18)  mg/dL


 


Creatinine   0.92   (0.51-1.17)  mg/dL


 


Est Cr Clr Drug Dosing   88.80   mL/min


 


Estimated GFR (MDRD)   > 60   mL/min


 


Glucose   103   ()  mg/dL


 


Lactic Acid    1.1  (0.4-2.0)  mmol/L


 


Calcium   8.6   (8.5-10.1)  mg/dL


 


Magnesium     (1.8-2.4)  mg/dL


 


Total Bilirubin   0.2   (0.2-1.0)  mg/dL


 


AST   19   (15-37)  U/L


 


ALT   40   (12-78)  U/L


 


Alkaline Phosphatase   77   ()  IU/L


 


Creatine Kinase     ()  U/L


 


Creatine Kinase Index     (0.0-2.5)  %


 


CK-MB (CK-2)     (0.00-3.60)  ng/mL


 


Troponin I     (0.000-0.056)  ng/mL


 


NT-Pro-B Natriuret Pep     (0-125)  pg/mL


 


Total Protein   7.8   (6.4-8.2)  g/dL


 


Albumin   3.9   (3.4-5.0)  g/dL


 


Amylase   51   ()  U/L


 


Lipase   150   ()  U/L


 


Specimen Type     


 


Urine Color     


 


Urine Appearance     


 


Urine pH     (5.0-9.0)  


 


Ur Specific Gravity     (1.005-1.030)  


 


Urine Protein     (NEGATIVE)  mg/dL


 


Urine Glucose (UA)     (NEGATIVE)  mg/dL


 


Urine Ketones     (NEGATIVE)  mg/dL


 


Urine Occult Blood     (NEGATIVE)  


 


Urine Nitrite     (NEGATIVE)  


 


Urine Bilirubin     (NEGATIVE)  


 


Urine Urobilinogen     (0.2-1.0)  E.U./dL


 


Ur Leukocyte Esterase     (NEGATIVE)  


 


Urine RBC     /HPF


 


Urine WBC     /HPF


 


Ur Epithelial Cells     /LPF


 


Amorphous Sediment     (0/HPF)  /HPF


 


Urine Bacteria     (NONE TO FEW)  /HPF














  04/23/20 04/23/20 04/24/20 Range/Units





  23:25 23:33 10:50 


 


WBC    9.7  (4.0-10.2)  K/uL


 


RBC    4.43  (4.33-5.41)  M/uL


 


Hgb    13.1  (13.1-16.8)  g/dL


 


Hct    39.4  (39.0-49.0)  %


 


MCV    88.9  (84.0-98.0)  fL


 


MCH    29.6  (28.2-33.3)  pg


 


MCHC    33.2  (31.7-36.0)  g/dL


 


RDW    13.2  (11.2-14.1)  %


 


Plt Count    259  (150-350)  K/uL


 


Neut % (Auto)    77.4  (45.0-80.0)  %


 


Lymph % (Auto)    14.2  (10.0-50.0)  %


 


Mono % (Auto)    8.0  (2.0-14.0)  %


 


Eos % (Auto)    0.2  (0.0-5.0)  %


 


Baso % (Auto)    0.2  (0.0-2.0)  %


 


Neut # (Auto)    7.49 H  (1.40-7.00)  K/uL


 


Lymph # (Auto)    1.38  (0.50-3.50)  K/uL


 


Mono # (Auto)    0.78  (0.00-1.00)  K/uL


 


Eos # (Auto)    0.02  (0.00-0.50)  K/uL


 


Baso # (Auto)    0.02  (0.00-0.20)  K/uL


 


PT     (9.5-12.0)  SEC


 


INR     


 


APTT     (24.5-32.8)  SEC


 


Sodium     (136-145)  mmol/L


 


Potassium     (3.5-5.1)  mmol/L


 


Chloride     ()  mmol/L


 


Carbon Dioxide     (21.0-32.0)  mmol/L


 


BUN     (7-18)  mg/dL


 


Creatinine     (0.51-1.17)  mg/dL


 


Est Cr Clr Drug Dosing     mL/min


 


Estimated GFR (MDRD)     mL/min


 


Glucose     ()  mg/dL


 


Lactic Acid     (0.4-2.0)  mmol/L


 


Calcium     (8.5-10.1)  mg/dL


 


Magnesium     (1.8-2.4)  mg/dL


 


Total Bilirubin     (0.2-1.0)  mg/dL


 


AST     (15-37)  U/L


 


ALT     (12-78)  U/L


 


Alkaline Phosphatase     ()  IU/L


 


Creatine Kinase   61   ()  U/L


 


Creatine Kinase Index   0.7   (0.0-2.5)  %


 


CK-MB (CK-2)   0.40   (0.00-3.60)  ng/mL


 


Troponin I   0.000   (0.000-0.056)  ng/mL


 


NT-Pro-B Natriuret Pep     (0-125)  pg/mL


 


Total Protein     (6.4-8.2)  g/dL


 


Albumin     (3.4-5.0)  g/dL


 


Amylase     ()  U/L


 


Lipase     ()  U/L


 


Specimen Type  Urinvoid    


 


Urine Color  Yellow    


 


Urine Appearance  Cloudy    


 


Urine pH  8.5    (5.0-9.0)  


 


Ur Specific Gravity  1.020    (1.005-1.030)  


 


Urine Protein  Negative    (NEGATIVE)  mg/dL


 


Urine Glucose (UA)  Negative    (NEGATIVE)  mg/dL


 


Urine Ketones  Negative    (NEGATIVE)  mg/dL


 


Urine Occult Blood  Negative    (NEGATIVE)  


 


Urine Nitrite  Negative    (NEGATIVE)  


 


Urine Bilirubin  Negative    (NEGATIVE)  


 


Urine Urobilinogen  0.2    (0.2-1.0)  E.U./dL


 


Ur Leukocyte Esterase  Negative    (NEGATIVE)  


 


Urine RBC  Not seen    /HPF


 


Urine WBC  Not seen    /HPF


 


Ur Epithelial Cells  Not seen    /LPF


 


Amorphous Sediment  Many H    (0/HPF)  /HPF


 


Urine Bacteria  Few    (NONE TO FEW)  /HPF














  04/24/20 04/24/20 04/25/20 Range/Units





  10:50 10:50 07:35 


 


WBC    10.4 H  (4.0-10.2)  K/uL


 


RBC    4.04 L  (4.33-5.41)  M/uL


 


Hgb    12.0 L  (13.1-16.8)  g/dL


 


Hct    36.6 L  (39.0-49.0)  %


 


MCV    90.6  (84.0-98.0)  fL


 


MCH    29.7  (28.2-33.3)  pg


 


MCHC    32.8  (31.7-36.0)  g/dL


 


RDW    13.6  (11.2-14.1)  %


 


Plt Count    238  (150-350)  K/uL


 


Neut % (Auto)    74.7  (45.0-80.0)  %


 


Lymph % (Auto)    15.4  (10.0-50.0)  %


 


Mono % (Auto)    9.8  (2.0-14.0)  %


 


Eos % (Auto)    0.0  (0.0-5.0)  %


 


Baso % (Auto)    0.1  (0.0-2.0)  %


 


Neut # (Auto)    7.79 H  (1.40-7.00)  K/uL


 


Lymph # (Auto)    1.60  (0.50-3.50)  K/uL


 


Mono # (Auto)    1.02 H  (0.00-1.00)  K/uL


 


Eos # (Auto)    0.00  (0.00-0.50)  K/uL


 


Baso # (Auto)    0.01  (0.00-0.20)  K/uL


 


PT     (9.5-12.0)  SEC


 


INR     


 


APTT     (24.5-32.8)  SEC


 


Sodium  140    (136-145)  mmol/L


 


Potassium  4.1    (3.5-5.1)  mmol/L


 


Chloride  105    ()  mmol/L


 


Carbon Dioxide  24.7    (21.0-32.0)  mmol/L


 


BUN  11    (7-18)  mg/dL


 


Creatinine  0.87    (0.51-1.17)  mg/dL


 


Est Cr Clr Drug Dosing  93.91    mL/min


 


Estimated GFR (MDRD)  > 60    mL/min


 


Glucose  114 H    ()  mg/dL


 


Lactic Acid   1.5   (0.4-2.0)  mmol/L


 


Calcium  8.1 L    (8.5-10.1)  mg/dL


 


Magnesium     (1.8-2.4)  mg/dL


 


Total Bilirubin  0.5    (0.2-1.0)  mg/dL


 


AST  15    (15-37)  U/L


 


ALT  33    (12-78)  U/L


 


Alkaline Phosphatase  63    ()  IU/L


 


Creatine Kinase     ()  U/L


 


Creatine Kinase Index     (0.0-2.5)  %


 


CK-MB (CK-2)     (0.00-3.60)  ng/mL


 


Troponin I     (0.000-0.056)  ng/mL


 


NT-Pro-B Natriuret Pep     (0-125)  pg/mL


 


Total Protein  6.8    (6.4-8.2)  g/dL


 


Albumin  3.5    (3.4-5.0)  g/dL


 


Amylase  43    ()  U/L


 


Lipase  97    ()  U/L


 


Specimen Type     


 


Urine Color     


 


Urine Appearance     


 


Urine pH     (5.0-9.0)  


 


Ur Specific Gravity     (1.005-1.030)  


 


Urine Protein     (NEGATIVE)  mg/dL


 


Urine Glucose (UA)     (NEGATIVE)  mg/dL


 


Urine Ketones     (NEGATIVE)  mg/dL


 


Urine Occult Blood     (NEGATIVE)  


 


Urine Nitrite     (NEGATIVE)  


 


Urine Bilirubin     (NEGATIVE)  


 


Urine Urobilinogen     (0.2-1.0)  E.U./dL


 


Ur Leukocyte Esterase     (NEGATIVE)  


 


Urine RBC     /HPF


 


Urine WBC     /HPF


 


Ur Epithelial Cells     /LPF


 


Amorphous Sediment     (0/HPF)  /HPF


 


Urine Bacteria     (NONE TO FEW)  /HPF














  04/25/20 04/26/20 04/26/20 Range/Units





  07:35 07:30 07:30 


 


WBC   9.9   (4.0-10.2)  K/uL


 


RBC   3.66 L   (4.33-5.41)  M/uL


 


Hgb   10.9 L   (13.1-16.8)  g/dL


 


Hct   33.5 L   (39.0-49.0)  %


 


MCV   91.5   (84.0-98.0)  fL


 


MCH   29.8   (28.2-33.3)  pg


 


MCHC   32.5   (31.7-36.0)  g/dL


 


RDW   13.6   (11.2-14.1)  %


 


Plt Count   215   (150-350)  K/uL


 


Neut % (Auto)   72.9   (45.0-80.0)  %


 


Lymph % (Auto)   14.3   (10.0-50.0)  %


 


Mono % (Auto)   12.0   (2.0-14.0)  %


 


Eos % (Auto)   0.6   (0.0-5.0)  %


 


Baso % (Auto)   0.2   (0.0-2.0)  %


 


Neut # (Auto)   7.19 H   (1.40-7.00)  K/uL


 


Lymph # (Auto)   1.41   (0.50-3.50)  K/uL


 


Mono # (Auto)   1.18 H   (0.00-1.00)  K/uL


 


Eos # (Auto)   0.06   (0.00-0.50)  K/uL


 


Baso # (Auto)   0.02   (0.00-0.20)  K/uL


 


PT    10.3  (9.5-12.0)  SEC


 


INR    1.0  


 


APTT    29.9  (24.5-32.8)  SEC


 


Sodium  140    (136-145)  mmol/L


 


Potassium  3.8    (3.5-5.1)  mmol/L


 


Chloride  107    ()  mmol/L


 


Carbon Dioxide  25.7    (21.0-32.0)  mmol/L


 


BUN  12    (7-18)  mg/dL


 


Creatinine  1.07    (0.51-1.17)  mg/dL


 


Est Cr Clr Drug Dosing  76.36    mL/min


 


Estimated GFR (MDRD)  > 60    mL/min


 


Glucose  104    ()  mg/dL


 


Lactic Acid     (0.4-2.0)  mmol/L


 


Calcium  7.3 L    (8.5-10.1)  mg/dL


 


Magnesium     (1.8-2.4)  mg/dL


 


Total Bilirubin  0.8    (0.2-1.0)  mg/dL


 


AST  30    (15-37)  U/L


 


ALT  44    (12-78)  U/L


 


Alkaline Phosphatase  47    ()  IU/L


 


Creatine Kinase     ()  U/L


 


Creatine Kinase Index     (0.0-2.5)  %


 


CK-MB (CK-2)     (0.00-3.60)  ng/mL


 


Troponin I     (0.000-0.056)  ng/mL


 


NT-Pro-B Natriuret Pep     (0-125)  pg/mL


 


Total Protein  6.0 L    (6.4-8.2)  g/dL


 


Albumin  3.0 L    (3.4-5.0)  g/dL


 


Amylase     ()  U/L


 


Lipase     ()  U/L


 


Specimen Type     


 


Urine Color     


 


Urine Appearance     


 


Urine pH     (5.0-9.0)  


 


Ur Specific Gravity     (1.005-1.030)  


 


Urine Protein     (NEGATIVE)  mg/dL


 


Urine Glucose (UA)     (NEGATIVE)  mg/dL


 


Urine Ketones     (NEGATIVE)  mg/dL


 


Urine Occult Blood     (NEGATIVE)  


 


Urine Nitrite     (NEGATIVE)  


 


Urine Bilirubin     (NEGATIVE)  


 


Urine Urobilinogen     (0.2-1.0)  E.U./dL


 


Ur Leukocyte Esterase     (NEGATIVE)  


 


Urine RBC     /HPF


 


Urine WBC     /HPF


 


Ur Epithelial Cells     /LPF


 


Amorphous Sediment     (0/HPF)  /HPF


 


Urine Bacteria     (NONE TO FEW)  /HPF














  04/26/20 04/26/20 04/27/20 Range/Units





  07:30 07:30 07:15 


 


WBC    8.2  (4.0-10.2)  K/uL


 


RBC    4.03 L  (4.33-5.41)  M/uL


 


Hgb    11.9 L  (13.1-16.8)  g/dL


 


Hct    36.1 L  (39.0-49.0)  %


 


MCV    89.6  (84.0-98.0)  fL


 


MCH    29.5  (28.2-33.3)  pg


 


MCHC    33.0  (31.7-36.0)  g/dL


 


RDW    13.1  (11.2-14.1)  %


 


Plt Count    243  (150-350)  K/uL


 


Neut % (Auto)    62.9  (45.0-80.0)  %


 


Lymph % (Auto)    21.8  (10.0-50.0)  %


 


Mono % (Auto)    12.8  (2.0-14.0)  %


 


Eos % (Auto)    2.1  (0.0-5.0)  %


 


Baso % (Auto)    0.4  (0.0-2.0)  %


 


Neut # (Auto)    5.13  (1.40-7.00)  K/uL


 


Lymph # (Auto)    1.78  (0.50-3.50)  K/uL


 


Mono # (Auto)    1.04 H  (0.00-1.00)  K/uL


 


Eos # (Auto)    0.17  (0.00-0.50)  K/uL


 


Baso # (Auto)    0.03  (0.00-0.20)  K/uL


 


PT     (9.5-12.0)  SEC


 


INR     


 


APTT     (24.5-32.8)  SEC


 


Sodium  139    (136-145)  mmol/L


 


Potassium  3.9    (3.5-5.1)  mmol/L


 


Chloride  106    ()  mmol/L


 


Carbon Dioxide  26.2    (21.0-32.0)  mmol/L


 


BUN  13    (7-18)  mg/dL


 


Creatinine  0.90    (0.51-1.17)  mg/dL


 


Est Cr Clr Drug Dosing  90.78    mL/min


 


Estimated GFR (MDRD)  > 60    mL/min


 


Glucose  94    ()  mg/dL


 


Lactic Acid     (0.4-2.0)  mmol/L


 


Calcium  7.7 L    (8.5-10.1)  mg/dL


 


Magnesium  1.9    (1.8-2.4)  mg/dL


 


Total Bilirubin  0.4    (0.2-1.0)  mg/dL


 


AST  27    (15-37)  U/L


 


ALT  32    (12-78)  U/L


 


Alkaline Phosphatase  49    ()  IU/L


 


Creatine Kinase     ()  U/L


 


Creatine Kinase Index     (0.0-2.5)  %


 


CK-MB (CK-2)     (0.00-3.60)  ng/mL


 


Troponin I     (0.000-0.056)  ng/mL


 


NT-Pro-B Natriuret Pep   754 H   (0-125)  pg/mL


 


Total Protein  5.8 L    (6.4-8.2)  g/dL


 


Albumin  2.7 L    (3.4-5.0)  g/dL


 


Amylase  25    ()  U/L


 


Lipase  54 L    ()  U/L


 


Specimen Type     


 


Urine Color     


 


Urine Appearance     


 


Urine pH     (5.0-9.0)  


 


Ur Specific Gravity     (1.005-1.030)  


 


Urine Protein     (NEGATIVE)  mg/dL


 


Urine Glucose (UA)     (NEGATIVE)  mg/dL


 


Urine Ketones     (NEGATIVE)  mg/dL


 


Urine Occult Blood     (NEGATIVE)  


 


Urine Nitrite     (NEGATIVE)  


 


Urine Bilirubin     (NEGATIVE)  


 


Urine Urobilinogen     (0.2-1.0)  E.U./dL


 


Ur Leukocyte Esterase     (NEGATIVE)  


 


Urine RBC     /HPF


 


Urine WBC     /HPF


 


Ur Epithelial Cells     /LPF


 


Amorphous Sediment     (0/HPF)  /HPF


 


Urine Bacteria     (NONE TO FEW)  /HPF














  04/27/20 Range/Units





  07:15 


 


WBC   (4.0-10.2)  K/uL


 


RBC   (4.33-5.41)  M/uL


 


Hgb   (13.1-16.8)  g/dL


 


Hct   (39.0-49.0)  %


 


MCV   (84.0-98.0)  fL


 


MCH   (28.2-33.3)  pg


 


MCHC   (31.7-36.0)  g/dL


 


RDW   (11.2-14.1)  %


 


Plt Count   (150-350)  K/uL


 


Neut % (Auto)   (45.0-80.0)  %


 


Lymph % (Auto)   (10.0-50.0)  %


 


Mono % (Auto)   (2.0-14.0)  %


 


Eos % (Auto)   (0.0-5.0)  %


 


Baso % (Auto)   (0.0-2.0)  %


 


Neut # (Auto)   (1.40-7.00)  K/uL


 


Lymph # (Auto)   (0.50-3.50)  K/uL


 


Mono # (Auto)   (0.00-1.00)  K/uL


 


Eos # (Auto)   (0.00-0.50)  K/uL


 


Baso # (Auto)   (0.00-0.20)  K/uL


 


PT   (9.5-12.0)  SEC


 


INR   


 


APTT   (24.5-32.8)  SEC


 


Sodium  140  (136-145)  mmol/L


 


Potassium  3.6  (3.5-5.1)  mmol/L


 


Chloride  103  ()  mmol/L


 


Carbon Dioxide  26.9  (21.0-32.0)  mmol/L


 


BUN  14  (7-18)  mg/dL


 


Creatinine  0.93  (0.51-1.17)  mg/dL


 


Est Cr Clr Drug Dosing  87.85  mL/min


 


Estimated GFR (MDRD)  > 60  mL/min


 


Glucose  100  ()  mg/dL


 


Lactic Acid   (0.4-2.0)  mmol/L


 


Calcium  8.2 L  (8.5-10.1)  mg/dL


 


Magnesium   (1.8-2.4)  mg/dL


 


Total Bilirubin   (0.2-1.0)  mg/dL


 


AST   (15-37)  U/L


 


ALT   (12-78)  U/L


 


Alkaline Phosphatase   ()  IU/L


 


Creatine Kinase   ()  U/L


 


Creatine Kinase Index   (0.0-2.5)  %


 


CK-MB (CK-2)   (0.00-3.60)  ng/mL


 


Troponin I   (0.000-0.056)  ng/mL


 


NT-Pro-B Natriuret Pep  459 H  (0-125)  pg/mL


 


Total Protein   (6.4-8.2)  g/dL


 


Albumin   (3.4-5.0)  g/dL


 


Amylase   ()  U/L


 


Lipase   ()  U/L


 


Specimen Type   


 


Urine Color   


 


Urine Appearance   


 


Urine pH   (5.0-9.0)  


 


Ur Specific Gravity   (1.005-1.030)  


 


Urine Protein   (NEGATIVE)  mg/dL


 


Urine Glucose (UA)   (NEGATIVE)  mg/dL


 


Urine Ketones   (NEGATIVE)  mg/dL


 


Urine Occult Blood   (NEGATIVE)  


 


Urine Nitrite   (NEGATIVE)  


 


Urine Bilirubin   (NEGATIVE)  


 


Urine Urobilinogen   (0.2-1.0)  E.U./dL


 


Ur Leukocyte Esterase   (NEGATIVE)  


 


Urine RBC   /HPF


 


Urine WBC   /HPF


 


Ur Epithelial Cells   /LPF


 


Amorphous Sediment   (0/HPF)  /HPF


 


Urine Bacteria   (NONE TO FEW)  /HPF











 Microbiology





04/26/20 10:27   Stool / Feces   Stool Occult Blood ([facility] C) - Final


                            NEGATIVE OCCULT BLOOD


                            REFERENCE RANGE: NEGATIVE

## 2020-12-14 ENCOUNTER — HOSPITAL ENCOUNTER (EMERGENCY)
Dept: HOSPITAL 7 - FB.ED | Age: 55
Discharge: HOME | End: 2020-12-14
Payer: COMMERCIAL

## 2020-12-14 DIAGNOSIS — R07.2: Primary | ICD-10-CM

## 2020-12-14 DIAGNOSIS — R42: ICD-10-CM

## 2020-12-14 DIAGNOSIS — R06.00: ICD-10-CM

## 2020-12-14 DIAGNOSIS — Z90.49: ICD-10-CM

## 2020-12-14 DIAGNOSIS — Z87.891: ICD-10-CM

## 2020-12-14 DIAGNOSIS — K21.9: ICD-10-CM

## 2020-12-14 NOTE — CR
INDICATION:  Cough, dyspnea.



CHEST, ONE VIEW:  An AP upright portable view of the chest 12/14/20 was compared
with 10/14/08, again revealing the heart to be normal in size and shape.  The 
aorta is somewhat more tortuous than on the previous study with suggestion of 
some minimal calcification in the arch. 



Overlying EKG leads are noted. 



A definite active infiltrate or effusion was not identified with pulmonary 
markings similar to the previous examination.  



IMPRESSION:  

1.  No definite active infiltrate or effusion - no acute process.  

2.  ASD aorta.   

MTDD

## 2020-12-14 NOTE — EDM.PDOC
ED HPI GENERAL MEDICAL PROBLEM





- General


Chief Complaint: Chest Pain


Stated Complaint: CHEST PAIN


Time Seen by Provider: 12/14/20 15:40


Source of Information: Reports: Patient


History Limitations: Reports: No Limitations





- History of Present Illness


INITIAL COMMENTS - FREE TEXT/NARRATIVE: 





Patient presented to the ED because of chest pain over the sternal area at 1500.

The pain is sharp and pressure, lasted for 10 minutes. He c/o dyspnea and 

dizziness  associated with the chest pain. There is no cough/cold, fever,chills.

There is no N/V/D. He was diagnosed with Covid on 10/24/20.


  ** Chest


Pain Score (Numeric/FACES): 5





- Related Data


                                    Allergies











Allergy/AdvReac Type Severity Reaction Status Date / Time


 


No Known Allergies Allergy   Verified 12/14/20 15:52











Home Meds: 


                                    Home Meds





Pantoprazole [ProTONIX***] 40 mg PO DAILY 12/14/20 [History]











Past Medical History


HEENT History: Reports: Impaired Vision


Gastrointestinal History: Reports: GERD


Psychiatric History: Reports: Anxiety





- Past Surgical History


GI Surgical History: Reports: Appendectomy, Cholecystectomy, Colonoscopy, EGD





Social & Family History





- Tobacco Use


Tobacco Use Status *Q: Former Tobacco User


Used Tobacco, but Quit: Yes


Month/Year Tobacco Last Used: 1996





- Caffeine Use


Caffeine Use: Reports: Coffee, Soda, Tea





- Recreational Drug Use


Recreational Drug Use: No





ED ROS GENERAL





- Review of Systems


Review Of Systems: See Below


Constitutional: Reports: No Symptoms


HEENT: Reports: No Symptoms


Respiratory: Reports: Shortness of Breath


Cardiovascular: Reports: Chest Pain


Endocrine: Reports: No Symptoms


GI/Abdominal: Reports: No Symptoms


: Reports: No Symptoms


Musculoskeletal: Reports: No Symptoms


Skin: Reports: No Symptoms


Neurological: Reports: No Symptoms


Psychiatric: Reports: No Symptoms





ED EXAM, GENERAL





- Physical Exam


Exam: See Below


Exam Limited By: No Limitations


General Appearance: Alert, No Apparent Distress


Eye Exam: Bilateral Eye: PERRL


Ears: Normal External Exam, Normal Canal


Nose: Normal Inspection, Normal Mucosa


Throat/Mouth: Normal Inspection, Normal Lips, Normal Teeth


Head: Atraumatic, Normocephalic


Neck: Normal Inspection, Supple, Non-Tender, Full Range of Motion


Respiratory/Chest: No Respiratory Distress, Lungs Clear, Normal Breath Sounds


Cardiovascular: Normal Peripheral Pulses, Regular Rate, Rhythm, No Edema, No 

Gallop, No JVD, No Murmur


GI/Abdominal: Normal Bowel Sounds, Soft, Non-Tender


Back Exam: Normal Inspection, Full Range of Motion





Course





- Vital Signs


Text/Narrative:: 





Labs/EKG/CXR was discussed with the patient


EKG-NSR


Trop-neg


[id number] mg po x1


Last Recorded V/S: 


                                Last Vital Signs











Temp  36.7 C   12/14/20 15:35


 


Pulse  98   12/14/20 15:35


 


Resp  19   12/14/20 15:35


 


BP  147/74 H  12/14/20 15:35


 


Pulse Ox  99   12/14/20 15:35














- Orders/Labs/Meds


Orders: 


                               Active Orders 24 hr











 Category Date Time Status


 


 EKG Documentation Completion [RC] ASDIRECTED Care  12/14/20 15:54 Active


 


 Chest 1V Frontal [CR] Stat Exams  12/14/20 15:53 Taken


 


 EKG 12 Lead [EK] Routine Ther  12/14/20 15:53 Ordered











Labs: 


                                Laboratory Tests











  12/14/20 12/14/20 12/14/20 Range/Units





  15:30 15:30 15:30 


 


WBC     (3.2-10.1)  x10-3/uL


 


RBC     (3.90-5.90)  x10(6)uL


 


Hgb     (12.9-17.7)  g/dL


 


Hct     (38.3-50.1)  %


 


MCV     (80.8-98.7)  fL


 


MCH     (27.0-33.3)  pg


 


MCHC     (28.7-35.3)  g/dL


 


RDW     (12.4-15.0)  %


 


Plt Count     (117-477)  x10(3)uL


 


MPV     (6.7-11.0)  fL


 


Neut % (Auto)     (40.3-71.8)  %


 


Lymph % (Auto)     (15.8-45.3)  %


 


Mono % (Auto)     (5.5-15.2)  %


 


Eos % (Auto)     (0.1-6.8)  %


 


Baso % (Auto)     (0.3-3.8)  %


 


Neut # (Auto)     (1.7-6.9)  x10-3/uL


 


Lymph # (Auto)     (0.5-4.5)  x10-3/uL


 


Mono # (Auto)     (0.0-1.2)  x10-3/uL


 


Eos # (Auto)     (0.0-0.6)  x10-3/uL


 


Baso # (Auto)     (0.0-0.3)  x10-3/uL


 


D-Dimer, Quantitative  < 0.19    (0.0-0.59)  mg/LFEU


 


Sodium   139   (135-145)  mmol/L


 


Potassium   3.8   (3.5-5.3)  mmol/L


 


Chloride   102   (100-110)  mmol/L


 


Carbon Dioxide   28   (21-32)  mmol/L


 


BUN   13   (7-18)  mg/dL


 


Creatinine   1.0   (0.70-1.30)  mg/dL


 


Est Cr Clr Drug Dosing   80.75   mL/min


 


Estimated GFR (MDRD)   > 60   (>60)  


 


BUN/Creatinine Ratio   13.0   (9-20)  


 


Glucose   121 H   ()  mg/dL


 


Calcium   8.3 L   (8.6-10.2)  mg/dL


 


Magnesium     (1.8-2.5)  mg/dL


 


Total Bilirubin   0.3   (0.1-1.3)  mg/dL


 


AST   22   (5-25)  IU/L


 


ALT   42 H   (12-36)  U/L


 


Alkaline Phosphatase   76   ()  IU/L


 


Troponin I    6.1  (4.0-60.3)  pg/mL


 


Total Protein   7.4   (6.0-8.0)  g/dL


 


Albumin   3.7   (3.5-5.2)  g/dL


 


Globulin   3.7   g/dL


 


Albumin/Globulin Ratio   1.0   














  12/14/20 12/14/20 Range/Units





  16:30 16:30 


 


WBC  5.6   (3.2-10.1)  x10-3/uL


 


RBC  4.81   (3.90-5.90)  x10(6)uL


 


Hgb  14.2   (12.9-17.7)  g/dL


 


Hct  43.1   (38.3-50.1)  %


 


MCV  89.6   (80.8-98.7)  fL


 


MCH  29.6   (27.0-33.3)  pg


 


MCHC  33.0   (28.7-35.3)  g/dL


 


RDW  14.0   (12.4-15.0)  %


 


Plt Count  313   (117-477)  x10(3)uL


 


MPV  7.0   (6.7-11.0)  fL


 


Neut % (Auto)  53.5   (40.3-71.8)  %


 


Lymph % (Auto)  33.9   (15.8-45.3)  %


 


Mono % (Auto)  10.1   (5.5-15.2)  %


 


Eos % (Auto)  1.5   (0.1-6.8)  %


 


Baso % (Auto)  1.0   (0.3-3.8)  %


 


Neut # (Auto)  3.0   (1.7-6.9)  x10-3/uL


 


Lymph # (Auto)  1.9   (0.5-4.5)  x10-3/uL


 


Mono # (Auto)  0.6   (0.0-1.2)  x10-3/uL


 


Eos # (Auto)  0.1   (0.0-0.6)  x10-3/uL


 


Baso # (Auto)  0.1   (0.0-0.3)  x10-3/uL


 


D-Dimer, Quantitative    (0.0-0.59)  mg/LFEU


 


Sodium    (135-145)  mmol/L


 


Potassium    (3.5-5.3)  mmol/L


 


Chloride    (100-110)  mmol/L


 


Carbon Dioxide    (21-32)  mmol/L


 


BUN    (7-18)  mg/dL


 


Creatinine    (0.70-1.30)  mg/dL


 


Est Cr Clr Drug Dosing    mL/min


 


Estimated GFR (MDRD)    (>60)  


 


BUN/Creatinine Ratio    (9-20)  


 


Glucose    ()  mg/dL


 


Calcium    (8.6-10.2)  mg/dL


 


Magnesium   1.9  (1.8-2.5)  mg/dL


 


Total Bilirubin    (0.1-1.3)  mg/dL


 


AST    (5-25)  IU/L


 


ALT    (12-36)  U/L


 


Alkaline Phosphatase    ()  IU/L


 


Troponin I    (4.0-60.3)  pg/mL


 


Total Protein    (6.0-8.0)  g/dL


 


Albumin    (3.5-5.2)  g/dL


 


Globulin    g/dL


 


Albumin/Globulin Ratio    











Meds: 


Medications














Discontinued Medications














Generic Name Dose Route Start Last Admin





  Trade Name Freq  PRN Reason Stop Dose Admin


 


Aspirin  324 mg  12/14/20 15:53  12/14/20 15:45





  Aspirin  PO  12/14/20 15:54  324 mg





  ONETIME ONE   Administration














Departure





- Departure


Time of Disposition: 17:00


Disposition: Home, Self-Care 01


Condition: Good


Clinical Impression: 


 Chest pain





Instructions:  Nonspecific Chest Pain, Adult


Referrals: 


PCP,Unknown [Primary Care Provider] - 


Forms:  ED Department Discharge


Additional Instructions: 


Please read discharge instructions on non-specific chest pain


Follow up with your doctor this week so you can have stress test if you have not

done it in the past





Sepsis Event Note (ED)





- Evaluation


Sepsis Screening Result: No Definite Risk





- Focused Exam


Vital Signs: 


                                   Vital Signs











  Temp Pulse Resp BP Pulse Ox


 


 12/14/20 15:35  36.7 C  98  19  147/74 H  99














- My Orders


Last 24 Hours: 


My Active Orders





12/14/20 15:53


Chest 1V Frontal [CR] Stat 


EKG 12 Lead [EK] Routine 





12/14/20 15:54


EKG Documentation Completion [RC] ASDIRECTED 














- Assessment/Plan


Last 24 Hours: 


My Active Orders





12/14/20 15:53


Chest 1V Frontal [CR] Stat 


EKG 12 Lead [EK] Routine 





12/14/20 15:54


EKG Documentation Completion [RC] ASDIRECTED

## 2022-06-30 ENCOUNTER — HOSPITAL ENCOUNTER (OUTPATIENT)
Dept: HOSPITAL 52 - LL.SDS | Age: 57
Discharge: HOME | End: 2022-06-30
Attending: SURGERY
Payer: COMMERCIAL

## 2022-06-30 DIAGNOSIS — R68.82: ICD-10-CM

## 2022-06-30 DIAGNOSIS — K22.89: ICD-10-CM

## 2022-06-30 DIAGNOSIS — Z90.49: ICD-10-CM

## 2022-06-30 DIAGNOSIS — Z12.11: Primary | ICD-10-CM

## 2022-06-30 DIAGNOSIS — G47.33: ICD-10-CM

## 2022-06-30 DIAGNOSIS — Z86.010: ICD-10-CM

## 2022-06-30 DIAGNOSIS — Z79.899: ICD-10-CM

## 2022-06-30 DIAGNOSIS — K22.70: ICD-10-CM

## 2022-06-30 DIAGNOSIS — K31.7: ICD-10-CM

## 2022-06-30 DIAGNOSIS — Z87.19: ICD-10-CM

## 2022-06-30 DIAGNOSIS — K20.90: ICD-10-CM

## 2022-06-30 DIAGNOSIS — K64.8: ICD-10-CM

## 2024-02-08 NOTE — PCM.PN
- General Info


Date of Service: 04/24/20





- Review of Systems


Systems Review Comment:: 





54-year-old male admitted to the hospital last night for upper abdominal pain. 

He had been experiencing several episodes of this over the last few days. 

Patient was evaluated with CT scan which documented cholelithiasis with dilated 

gallbladder. Patient continues to have upper abdominal pain and tenderness. I 

have recommended urgent cholecystectomy with the patient's continued symptoms. 

Liver function tests are normal. Abdominal exam shows his abdomen is soft but 

there is tenderness to palpation in the upper abdomen. I do not feel any 

abdominal masses. His only previous abdominal surgery was appendectomy. His 

medical history is reviewed and no contraindications to proceeding with surgery 

today are identified. I have discussed the proposed cholecystectomy with the 

patient. We have carefully reviewed the indications options for treatment. I 

discussed the expected course and postop instructions. I also carefully 

reviewed with him risks such as but not limited to bleeding,infection, and 

organ injury. We also discussed possible need to convert to a laparotomy. 

Patient's questions are answered. He agrees to proceed.





- Patient Data


Vitals - Most Recent: 


 Last Vital Signs











Temp  97.9 F   04/24/20 06:19


 


Pulse  95   04/24/20 06:19


 


Resp  19   04/24/20 06:19


 


BP  128/77   04/24/20 06:19


 


Pulse Ox  99   04/24/20 06:19











Weight - Most Recent: 78.471 kg


I&O - Last 24 Hours: 


 Intake & Output











 04/23/20 04/24/20 04/24/20





 22:59 06:59 14:59


 


Output Total   500


 


Balance   -500











Lab Results Last 24 Hours: 


 Laboratory Results - last 24 hr











  04/23/20 04/23/20 04/23/20 Range/Units





  23:22 23:22 23:22 


 


WBC  9.7    (4.0-10.2)  K/uL


 


RBC  4.92    (4.33-5.41)  M/uL


 


Hgb  14.5    (13.1-16.8)  g/dL


 


Hct  43.5    (39.0-49.0)  %


 


MCV  88.4    (84.0-98.0)  fL


 


MCH  29.5    (28.2-33.3)  pg


 


MCHC  33.3    (31.7-36.0)  g/dL


 


RDW  13.2    (11.2-14.1)  %


 


Plt Count  286    (150-350)  K/uL


 


Neut % (Auto)  63.5    (45.0-80.0)  %


 


Lymph % (Auto)  25.4    (10.0-50.0)  %


 


Mono % (Auto)  9.1    (2.0-14.0)  %


 


Eos % (Auto)  1.7    (0.0-5.0)  %


 


Baso % (Auto)  0.3    (0.0-2.0)  %


 


Neut # (Auto)  6.17    (1.40-7.00)  K/uL


 


Lymph # (Auto)  2.47    (0.50-3.50)  K/uL


 


Mono # (Auto)  0.88    (0.00-1.00)  K/uL


 


Eos # (Auto)  0.17    (0.00-0.50)  K/uL


 


Baso # (Auto)  0.03    (0.00-0.20)  K/uL


 


Sodium   140   (136-145)  mmol/L


 


Potassium   3.9   (3.5-5.1)  mmol/L


 


Chloride   102   ()  mmol/L


 


Carbon Dioxide   29.7   (21.0-32.0)  mmol/L


 


BUN   11   (7-18)  mg/dL


 


Creatinine   0.92   (0.51-1.17)  mg/dL


 


Est Cr Clr Drug Dosing   88.80   mL/min


 


Estimated GFR (MDRD)   > 60   mL/min


 


Glucose   103   ()  mg/dL


 


Lactic Acid    1.1  (0.4-2.0)  mmol/L


 


Calcium   8.6   (8.5-10.1)  mg/dL


 


Total Bilirubin   0.2   (0.2-1.0)  mg/dL


 


AST   19   (15-37)  U/L


 


ALT   40   (12-78)  U/L


 


Alkaline Phosphatase   77   ()  IU/L


 


Creatine Kinase     ()  U/L


 


Creatine Kinase Index     (0.0-2.5)  %


 


CK-MB (CK-2)     (0.00-3.60)  ng/mL


 


Troponin I     (0.000-0.056)  ng/mL


 


Total Protein   7.8   (6.4-8.2)  g/dL


 


Albumin   3.9   (3.4-5.0)  g/dL


 


Amylase   51   ()  U/L


 


Lipase   150   ()  U/L


 


Specimen Type     


 


Urine Color     


 


Urine Appearance     


 


Urine pH     (5.0-9.0)  


 


Ur Specific Gravity     (1.005-1.030)  


 


Urine Protein     (NEGATIVE)  mg/dL


 


Urine Glucose (UA)     (NEGATIVE)  mg/dL


 


Urine Ketones     (NEGATIVE)  mg/dL


 


Urine Occult Blood     (NEGATIVE)  


 


Urine Nitrite     (NEGATIVE)  


 


Urine Bilirubin     (NEGATIVE)  


 


Urine Urobilinogen     (0.2-1.0)  E.U./dL


 


Ur Leukocyte Esterase     (NEGATIVE)  


 


Urine RBC     /HPF


 


Urine WBC     /HPF


 


Ur Epithelial Cells     /LPF


 


Amorphous Sediment     (0/HPF)  /HPF


 


Urine Bacteria     (NONE TO FEW)  /HPF














  04/23/20 04/23/20 04/24/20 Range/Units





  23:25 23:33 10:50 


 


WBC    9.7  (4.0-10.2)  K/uL


 


RBC    4.43  (4.33-5.41)  M/uL


 


Hgb    13.1  (13.1-16.8)  g/dL


 


Hct    39.4  (39.0-49.0)  %


 


MCV    88.9  (84.0-98.0)  fL


 


MCH    29.6  (28.2-33.3)  pg


 


MCHC    33.2  (31.7-36.0)  g/dL


 


RDW    13.2  (11.2-14.1)  %


 


Plt Count    259  (150-350)  K/uL


 


Neut % (Auto)    77.4  (45.0-80.0)  %


 


Lymph % (Auto)    14.2  (10.0-50.0)  %


 


Mono % (Auto)    8.0  (2.0-14.0)  %


 


Eos % (Auto)    0.2  (0.0-5.0)  %


 


Baso % (Auto)    0.2  (0.0-2.0)  %


 


Neut # (Auto)    7.49 H  (1.40-7.00)  K/uL


 


Lymph # (Auto)    1.38  (0.50-3.50)  K/uL


 


Mono # (Auto)    0.78  (0.00-1.00)  K/uL


 


Eos # (Auto)    0.02  (0.00-0.50)  K/uL


 


Baso # (Auto)    0.02  (0.00-0.20)  K/uL


 


Sodium     (136-145)  mmol/L


 


Potassium     (3.5-5.1)  mmol/L


 


Chloride     ()  mmol/L


 


Carbon Dioxide     (21.0-32.0)  mmol/L


 


BUN     (7-18)  mg/dL


 


Creatinine     (0.51-1.17)  mg/dL


 


Est Cr Clr Drug Dosing     mL/min


 


Estimated GFR (MDRD)     mL/min


 


Glucose     ()  mg/dL


 


Lactic Acid     (0.4-2.0)  mmol/L


 


Calcium     (8.5-10.1)  mg/dL


 


Total Bilirubin     (0.2-1.0)  mg/dL


 


AST     (15-37)  U/L


 


ALT     (12-78)  U/L


 


Alkaline Phosphatase     ()  IU/L


 


Creatine Kinase   61   ()  U/L


 


Creatine Kinase Index   0.7   (0.0-2.5)  %


 


CK-MB (CK-2)   0.40   (0.00-3.60)  ng/mL


 


Troponin I   0.000   (0.000-0.056)  ng/mL


 


Total Protein     (6.4-8.2)  g/dL


 


Albumin     (3.4-5.0)  g/dL


 


Amylase     ()  U/L


 


Lipase     ()  U/L


 


Specimen Type  Urinvoid    


 


Urine Color  Yellow    


 


Urine Appearance  Cloudy    


 


Urine pH  8.5    (5.0-9.0)  


 


Ur Specific Gravity  1.020    (1.005-1.030)  


 


Urine Protein  Negative    (NEGATIVE)  mg/dL


 


Urine Glucose (UA)  Negative    (NEGATIVE)  mg/dL


 


Urine Ketones  Negative    (NEGATIVE)  mg/dL


 


Urine Occult Blood  Negative    (NEGATIVE)  


 


Urine Nitrite  Negative    (NEGATIVE)  


 


Urine Bilirubin  Negative    (NEGATIVE)  


 


Urine Urobilinogen  0.2    (0.2-1.0)  E.U./dL


 


Ur Leukocyte Esterase  Negative    (NEGATIVE)  


 


Urine RBC  Not seen    /HPF


 


Urine WBC  Not seen    /HPF


 


Ur Epithelial Cells  Not seen    /LPF


 


Amorphous Sediment  Many H    (0/HPF)  /HPF


 


Urine Bacteria  Few    (NONE TO FEW)  /HPF














  04/24/20 04/24/20 Range/Units





  10:50 10:50 


 


WBC    (4.0-10.2)  K/uL


 


RBC    (4.33-5.41)  M/uL


 


Hgb    (13.1-16.8)  g/dL


 


Hct    (39.0-49.0)  %


 


MCV    (84.0-98.0)  fL


 


MCH    (28.2-33.3)  pg


 


MCHC    (31.7-36.0)  g/dL


 


RDW    (11.2-14.1)  %


 


Plt Count    (150-350)  K/uL


 


Neut % (Auto)    (45.0-80.0)  %


 


Lymph % (Auto)    (10.0-50.0)  %


 


Mono % (Auto)    (2.0-14.0)  %


 


Eos % (Auto)    (0.0-5.0)  %


 


Baso % (Auto)    (0.0-2.0)  %


 


Neut # (Auto)    (1.40-7.00)  K/uL


 


Lymph # (Auto)    (0.50-3.50)  K/uL


 


Mono # (Auto)    (0.00-1.00)  K/uL


 


Eos # (Auto)    (0.00-0.50)  K/uL


 


Baso # (Auto)    (0.00-0.20)  K/uL


 


Sodium  140   (136-145)  mmol/L


 


Potassium  4.1   (3.5-5.1)  mmol/L


 


Chloride  105   ()  mmol/L


 


Carbon Dioxide  24.7   (21.0-32.0)  mmol/L


 


BUN  11   (7-18)  mg/dL


 


Creatinine  0.87   (0.51-1.17)  mg/dL


 


Est Cr Clr Drug Dosing  93.91   mL/min


 


Estimated GFR (MDRD)  > 60   mL/min


 


Glucose  114 H   ()  mg/dL


 


Lactic Acid   1.5  (0.4-2.0)  mmol/L


 


Calcium  8.1 L   (8.5-10.1)  mg/dL


 


Total Bilirubin  0.5   (0.2-1.0)  mg/dL


 


AST  15   (15-37)  U/L


 


ALT  33   (12-78)  U/L


 


Alkaline Phosphatase  63   ()  IU/L


 


Creatine Kinase    ()  U/L


 


Creatine Kinase Index    (0.0-2.5)  %


 


CK-MB (CK-2)    (0.00-3.60)  ng/mL


 


Troponin I    (0.000-0.056)  ng/mL


 


Total Protein  6.8   (6.4-8.2)  g/dL


 


Albumin  3.5   (3.4-5.0)  g/dL


 


Amylase  43   ()  U/L


 


Lipase  97   ()  U/L


 


Specimen Type    


 


Urine Color    


 


Urine Appearance    


 


Urine pH    (5.0-9.0)  


 


Ur Specific Gravity    (1.005-1.030)  


 


Urine Protein    (NEGATIVE)  mg/dL


 


Urine Glucose (UA)    (NEGATIVE)  mg/dL


 


Urine Ketones    (NEGATIVE)  mg/dL


 


Urine Occult Blood    (NEGATIVE)  


 


Urine Nitrite    (NEGATIVE)  


 


Urine Bilirubin    (NEGATIVE)  


 


Urine Urobilinogen    (0.2-1.0)  E.U./dL


 


Ur Leukocyte Esterase    (NEGATIVE)  


 


Urine RBC    /HPF


 


Urine WBC    /HPF


 


Ur Epithelial Cells    /LPF


 


Amorphous Sediment    (0/HPF)  /HPF


 


Urine Bacteria    (NONE TO FEW)  /HPF











Med Orders - Current: 


 Current Medications





Calcium Carbonate/Glycine (Tums Extra Strength)  750 mg PO Q2HR PRN


   PRN Reason: Indigestion


Sodium Chloride (Normal Saline)  1,000 mls @ 125 mls/hr IV ASDIRECTED [facility]


   Last Admin: 04/24/20 10:00 Dose:  125 mls/hr


Lactated Ringer's (Ringers, Lactated)  1,000 mls @ 75 mls/hr IV ASDIRECTED [facility]


   Last Admin: 04/24/20 12:35 Dose:  75 mls/hr


Morphine Sulfate (Morphine)  2 mg IVPUSH Q2H PRN


   PRN Reason: Pain (severe 7-10)


Ondansetron HCl (Zofran)  4 mg IVPUSH Q6H PRN


   PRN Reason: Nausea/Vomiting


   Last Admin: 04/24/20 06:10 Dose:  4 mg


Sodium Chloride (Saline Flush)  10 ml FLUSH ASDIRECTED PRN


   PRN Reason: Keep Vein Open





Discontinued Medications





Al Hydroxide/Mg Hydroxide (Gi Cocktail)  30 ml PO ONETIME ONE


   Stop: 04/23/20 23:22


   Last Admin: 04/23/20 23:27 Dose:  30 ml


Famotidine (Pepcid)  20 mg IVPUSH ONETIME ONE


   Stop: 04/24/20 00:37


   Last Admin: 04/24/20 01:40 Dose:  20 mg


Fentanyl (Sublimaze) Confirm Administered Dose 250 mcg .ROUTE .STK-MED ONE


   Stop: 04/24/20 12:50


Hydromorphone HCl (Dilaudid)  1 mg IM ONETIME ONE


   Stop: 04/24/20 00:44


   Last Admin: 04/24/20 01:04 Dose:  1 mg


Iopamidol (Isovue-300 (61%))  100 ml IVPUSH ONETIME ONE


   Stop: 04/24/20 01:21


   Last Admin: 04/24/20 02:03 Dose:  100 ml


Meperidine HCl (Demerol)  25 mg IVPUSH ONETIME ONE


   Stop: 04/24/20 12:10


   Last Admin: 04/24/20 12:35 Dose:  25 mg


Midazolam HCl (Versed 1 Mg/Ml) Confirm Administered Dose 2 mg .ROUTE .STK-MED 

ONE


   Stop: 04/24/20 12:50


Morphine Sulfate (Morphine)  4 mg IVPUSH ONETIME ONE


   Stop: 04/24/20 00:40


   Last Admin: 04/24/20 02:36 Dose:  Not Given


Ondansetron HCl (Zofran)  4 mg IVPUSH ONETIME ONE


   Stop: 04/24/20 00:40


   Last Admin: 04/24/20 02:37 Dose:  Not Given


Ondansetron HCl (Zofran Odt)  4 mg PO ONETIME ONE


   Stop: 04/24/20 00:45


   Last Admin: 04/24/20 01:03 Dose:  4 mg


Pantoprazole Sodium (Protonix Iv***)  80 mg IVPUSH .BOLUS ONE


   Stop: 04/24/20 00:37


   Last Admin: 04/24/20 01:40 Dose:  80 mg


Propofol (Diprivan  20 Ml) Confirm Administered Dose 200 mg .ROUTE .STK-MED ONE


   Stop: 04/24/20 12:50


Scopolamine (Transderm-Scop)  1.5 mg TRDERM Q72H ONE


   Stop: 04/24/20 12:44


   Last Admin: 04/24/20 12:55 Dose:  1.5 mg


Scopolamine (Transderm-Scop) Confirm Administered Dose 1.5 mg .ROUTE .STK-MED 

ONE


   Stop: 04/24/20 12:44


Sucralfate (Carafate)  1 gm PO ONETIME ONE


   Stop: 04/24/20 00:41


   Last Admin: 04/24/20 01:03 Dose:  1 gm











Sepsis Event Note





- Evaluation


Sepsis Screening Result: No Definite Risk





- Focused Exam


Vital Signs: 


 Vital Signs











  Temp Pulse Resp BP Pulse Ox


 


 04/24/20 06:19  97.9 F  95  19  128/77  99











Date Exam was Performed: 04/24/20


Time Exam was Performed: 13:28





- Problem List Review


Problem List Initiated/Reviewed/Updated: Yes





- Assessment


Assessment:: 





cholelithiasis with acute cholecystitis





- Plan


Plan:: 





laparoscopic cholecystectomy normal (ped)...